# Patient Record
Sex: MALE | Race: WHITE | Employment: UNEMPLOYED | URBAN - METROPOLITAN AREA
[De-identification: names, ages, dates, MRNs, and addresses within clinical notes are randomized per-mention and may not be internally consistent; named-entity substitution may affect disease eponyms.]

---

## 2017-03-07 ENCOUNTER — OFFICE VISIT (OUTPATIENT)
Dept: FAMILY MEDICINE CLINIC | Age: 32
End: 2017-03-07

## 2017-03-07 VITALS
SYSTOLIC BLOOD PRESSURE: 116 MMHG | TEMPERATURE: 98.2 F | RESPIRATION RATE: 16 BRPM | DIASTOLIC BLOOD PRESSURE: 79 MMHG | OXYGEN SATURATION: 98 % | HEIGHT: 68 IN | WEIGHT: 172.6 LBS | HEART RATE: 82 BPM | BODY MASS INDEX: 26.16 KG/M2

## 2017-03-07 DIAGNOSIS — F31.31 BIPOLAR AFFECTIVE DISORDER, CURRENTLY DEPRESSED, MILD (HCC): ICD-10-CM

## 2017-03-07 DIAGNOSIS — M25.561 ACUTE PAIN OF BOTH KNEES: Primary | ICD-10-CM

## 2017-03-07 DIAGNOSIS — E66.3 OVERWEIGHT: ICD-10-CM

## 2017-03-07 DIAGNOSIS — Z13.220 LIPID SCREENING: ICD-10-CM

## 2017-03-07 DIAGNOSIS — F20.0 PARANOID SCHIZOPHRENIA (HCC): ICD-10-CM

## 2017-03-07 DIAGNOSIS — F41.9 ANXIETY: ICD-10-CM

## 2017-03-07 DIAGNOSIS — Z86.19 HISTORY OF HEPATITIS C: ICD-10-CM

## 2017-03-07 DIAGNOSIS — M25.562 ACUTE PAIN OF BOTH KNEES: Primary | ICD-10-CM

## 2017-03-07 PROCEDURE — 99204 OFFICE O/P NEW MOD 45 MIN: CPT | Performed by: FAMILY MEDICINE

## 2017-03-07 RX ORDER — NAPROXEN 500 MG/1
500 TABLET ORAL 2 TIMES DAILY WITH MEALS
Qty: 30 TABLET | Refills: 0 | Status: SHIPPED | OUTPATIENT
Start: 2017-03-07 | End: 2019-08-21 | Stop reason: CLARIF

## 2017-03-07 ASSESSMENT — ENCOUNTER SYMPTOMS
SINUS PRESSURE: 0
CONSTIPATION: 0
COUGH: 0
CHOKING: 0
BACK PAIN: 0
SORE THROAT: 0
EYE REDNESS: 0
SHORTNESS OF BREATH: 0
WHEEZING: 0
RHINORRHEA: 0
EYE ITCHING: 0
ANAL BLEEDING: 0
ABDOMINAL DISTENTION: 0
EYE PAIN: 0
PHOTOPHOBIA: 0
STRIDOR: 0
VOMITING: 0
VOICE CHANGE: 0
BLOOD IN STOOL: 0
RECTAL PAIN: 0
TROUBLE SWALLOWING: 0
EYE DISCHARGE: 0
CHEST TIGHTNESS: 0
NAUSEA: 0
COLOR CHANGE: 0
DIARRHEA: 0
FACIAL SWELLING: 0
ABDOMINAL PAIN: 0
APNEA: 0

## 2019-08-21 ENCOUNTER — OFFICE VISIT (OUTPATIENT)
Dept: FAMILY MEDICINE CLINIC | Age: 34
End: 2019-08-21
Payer: COMMERCIAL

## 2019-08-21 VITALS
HEART RATE: 81 BPM | HEIGHT: 68 IN | WEIGHT: 175.7 LBS | DIASTOLIC BLOOD PRESSURE: 72 MMHG | BODY MASS INDEX: 26.63 KG/M2 | SYSTOLIC BLOOD PRESSURE: 118 MMHG | RESPIRATION RATE: 16 BRPM | TEMPERATURE: 98.6 F | OXYGEN SATURATION: 98 %

## 2019-08-21 DIAGNOSIS — B18.2 HEP C W/O COMA, CHRONIC (HCC): ICD-10-CM

## 2019-08-21 DIAGNOSIS — Z86.19 HISTORY OF HEPATITIS C: ICD-10-CM

## 2019-08-21 DIAGNOSIS — K21.9 GASTROESOPHAGEAL REFLUX DISEASE WITHOUT ESOPHAGITIS: Primary | ICD-10-CM

## 2019-08-21 DIAGNOSIS — Z11.59 SCREENING FOR VIRAL DISEASE: ICD-10-CM

## 2019-08-21 DIAGNOSIS — F17.200 TOBACCO USE DISORDER: ICD-10-CM

## 2019-08-21 DIAGNOSIS — F12.90 MARIJUANA USE: ICD-10-CM

## 2019-08-21 DIAGNOSIS — Z13.220 LIPID SCREENING: ICD-10-CM

## 2019-08-21 PROCEDURE — G8427 DOCREV CUR MEDS BY ELIG CLIN: HCPCS | Performed by: FAMILY MEDICINE

## 2019-08-21 PROCEDURE — 4004F PT TOBACCO SCREEN RCVD TLK: CPT | Performed by: FAMILY MEDICINE

## 2019-08-21 PROCEDURE — 99214 OFFICE O/P EST MOD 30 MIN: CPT | Performed by: FAMILY MEDICINE

## 2019-08-21 PROCEDURE — G8419 CALC BMI OUT NRM PARAM NOF/U: HCPCS | Performed by: FAMILY MEDICINE

## 2019-08-21 RX ORDER — PANTOPRAZOLE SODIUM 20 MG/1
20 TABLET, DELAYED RELEASE ORAL DAILY
Qty: 30 TABLET | Refills: 0 | Status: SHIPPED | OUTPATIENT
Start: 2019-08-21 | End: 2019-08-21 | Stop reason: SDUPTHER

## 2019-08-21 RX ORDER — PANTOPRAZOLE SODIUM 20 MG/1
20 TABLET, DELAYED RELEASE ORAL DAILY
Qty: 30 TABLET | Refills: 0 | Status: SHIPPED | OUTPATIENT
Start: 2019-08-21 | End: 2019-09-15 | Stop reason: SDUPTHER

## 2019-08-21 ASSESSMENT — ENCOUNTER SYMPTOMS
WHEEZING: 0
ANAL BLEEDING: 0
CONSTIPATION: 0
VOMITING: 0
BLOOD IN STOOL: 0
COUGH: 0
STRIDOR: 0
SHORTNESS OF BREATH: 0
NAUSEA: 0
RECTAL PAIN: 0
DIARRHEA: 0
ABDOMINAL PAIN: 0
CHEST TIGHTNESS: 0
ABDOMINAL DISTENTION: 0
APNEA: 0
CHOKING: 0

## 2019-08-21 ASSESSMENT — PATIENT HEALTH QUESTIONNAIRE - PHQ9
SUM OF ALL RESPONSES TO PHQ QUESTIONS 1-9: 0
SUM OF ALL RESPONSES TO PHQ9 QUESTIONS 1 & 2: 0
2. FEELING DOWN, DEPRESSED OR HOPELESS: 0
1. LITTLE INTEREST OR PLEASURE IN DOING THINGS: 0
SUM OF ALL RESPONSES TO PHQ QUESTIONS 1-9: 0

## 2019-08-21 NOTE — PATIENT INSTRUCTIONS
Patient Education        Gastroesophageal Reflux Disease (GERD): Care Instructions  Your Care Instructions    Gastroesophageal reflux disease (GERD) is the backward flow of stomach acid into the esophagus. The esophagus is the tube that leads from your throat to your stomach. A one-way valve prevents the stomach acid from moving up into this tube. When you have GERD, this valve does not close tightly enough. If you have mild GERD symptoms including heartburn, you may be able to control the problem with antacids or over-the-counter medicine. Changing your diet, losing weight, and making other lifestyle changes can also help reduce symptoms. Follow-up care is a key part of your treatment and safety. Be sure to make and go to all appointments, and call your doctor if you are having problems. It's also a good idea to know your test results and keep a list of the medicines you take. How can you care for yourself at home? · Take your medicines exactly as prescribed. Call your doctor if you think you are having a problem with your medicine. · Your doctor may recommend over-the-counter medicine. For mild or occasional indigestion, antacids, such as Tums, Gaviscon, Mylanta, or Maalox, may help. Your doctor also may recommend over-the-counter acid reducers, such as Pepcid AC, Tagamet HB, Zantac 75, or Prilosec. Read and follow all instructions on the label. If you use these medicines often, talk with your doctor. · Change your eating habits. ? It's best to eat several small meals instead of two or three large meals. ? After you eat, wait 2 to 3 hours before you lie down. ? Chocolate, mint, and alcohol can make GERD worse. ? Spicy foods, foods that have a lot of acid (like tomatoes and oranges), and coffee can make GERD symptoms worse in some people. If your symptoms are worse after you eat a certain food, you may want to stop eating that food to see if your symptoms get better.   · Do not smoke or chew tobacco. Smoking can make GERD worse. If you need help quitting, talk to your doctor about stop-smoking programs and medicines. These can increase your chances of quitting for good. · If you have GERD symptoms at night, raise the head of your bed 6 to 8 inches by putting the frame on blocks or placing a foam wedge under the head of your mattress. (Adding extra pillows does not work.)  · Do not wear tight clothing around your middle. · Lose weight if you need to. Losing just 5 to 10 pounds can help. When should you call for help? Call your doctor now or seek immediate medical care if:    · You have new or different belly pain.     · Your stools are black and tarlike or have streaks of blood.    Watch closely for changes in your health, and be sure to contact your doctor if:    · Your symptoms have not improved after 2 days.     · Food seems to catch in your throat or chest.   Where can you learn more? Go to [email or web address]. org and sign in to your [employer] account. Enter Z708 in the [employer]blur box to learn more about \"Gastroesophageal Reflux Disease (GERD): Care Instructions. \"     If you do not have an account, please click on the \"Sign Up Now\" link. Current as of: November 7, 2018  Content Version: 12.1  © 1664-7017 Healthwise, Incorporated. Care instructions adapted under license by [facility] ([facility]). If you have questions about a medical condition or this instruction, always ask your healthcare professional. [place] [address] any warranty or liability for your use of this information.

## 2019-09-17 ENCOUNTER — TELEPHONE (OUTPATIENT)
Dept: FAMILY MEDICINE CLINIC | Age: 34
End: 2019-09-17

## 2019-09-17 DIAGNOSIS — F17.200 TOBACCO USE DISORDER: Primary | ICD-10-CM

## 2019-09-17 RX ORDER — NICOTINE 21 MG/24HR
1 PATCH, TRANSDERMAL 24 HOURS TRANSDERMAL EVERY 24 HOURS
Qty: 30 PATCH | Refills: 0 | Status: SHIPPED | OUTPATIENT
Start: 2019-09-17 | End: 2020-01-17 | Stop reason: DRUGHIGH

## 2020-01-15 ENCOUNTER — OFFICE VISIT (OUTPATIENT)
Dept: FAMILY MEDICINE CLINIC | Age: 35
End: 2020-01-15
Payer: COMMERCIAL

## 2020-01-15 VITALS
HEIGHT: 68 IN | DIASTOLIC BLOOD PRESSURE: 86 MMHG | HEART RATE: 80 BPM | RESPIRATION RATE: 16 BRPM | BODY MASS INDEX: 26.21 KG/M2 | SYSTOLIC BLOOD PRESSURE: 114 MMHG | WEIGHT: 172.9 LBS

## 2020-01-15 DIAGNOSIS — Z13.220 LIPID SCREENING: ICD-10-CM

## 2020-01-15 DIAGNOSIS — B18.2 HEP C W/O COMA, CHRONIC (HCC): ICD-10-CM

## 2020-01-15 DIAGNOSIS — Z86.19 HISTORY OF HEPATITIS C: ICD-10-CM

## 2020-01-15 DIAGNOSIS — Z11.59 SCREENING FOR VIRAL DISEASE: ICD-10-CM

## 2020-01-15 PROCEDURE — G8427 DOCREV CUR MEDS BY ELIG CLIN: HCPCS | Performed by: FAMILY MEDICINE

## 2020-01-15 PROCEDURE — G8484 FLU IMMUNIZE NO ADMIN: HCPCS | Performed by: FAMILY MEDICINE

## 2020-01-15 PROCEDURE — 36415 COLL VENOUS BLD VENIPUNCTURE: CPT | Performed by: FAMILY MEDICINE

## 2020-01-15 PROCEDURE — 99213 OFFICE O/P EST LOW 20 MIN: CPT | Performed by: FAMILY MEDICINE

## 2020-01-15 PROCEDURE — G8419 CALC BMI OUT NRM PARAM NOF/U: HCPCS | Performed by: FAMILY MEDICINE

## 2020-01-15 PROCEDURE — 4004F PT TOBACCO SCREEN RCVD TLK: CPT | Performed by: FAMILY MEDICINE

## 2020-01-15 RX ORDER — PANTOPRAZOLE SODIUM 20 MG/1
TABLET, DELAYED RELEASE ORAL
Qty: 30 TABLET | Refills: 2 | Status: SHIPPED | OUTPATIENT
Start: 2020-01-15 | End: 2020-02-06

## 2020-01-15 ASSESSMENT — PATIENT HEALTH QUESTIONNAIRE - PHQ9
SUM OF ALL RESPONSES TO PHQ QUESTIONS 1-9: 0
1. LITTLE INTEREST OR PLEASURE IN DOING THINGS: 0
SUM OF ALL RESPONSES TO PHQ QUESTIONS 1-9: 0
SUM OF ALL RESPONSES TO PHQ9 QUESTIONS 1 & 2: 0
2. FEELING DOWN, DEPRESSED OR HOPELESS: 0

## 2020-01-15 ASSESSMENT — ENCOUNTER SYMPTOMS
ANAL BLEEDING: 0
COUGH: 0
CHOKING: 0
ABDOMINAL DISTENTION: 0
ABDOMINAL PAIN: 0
BLOOD IN STOOL: 0
CONSTIPATION: 0
VOMITING: 0
WHEEZING: 0
APNEA: 0
RECTAL PAIN: 0
NAUSEA: 0
STRIDOR: 0
DIARRHEA: 0
CHEST TIGHTNESS: 0
SHORTNESS OF BREATH: 0

## 2020-01-15 NOTE — PROGRESS NOTES
Subjective:      Patient ID: Dante Marie is a 29 y.o. male. HPI           GERD f/u: Taking medication without side effects. Doing well. No new associated/worsening or other improving factors. Denies abdo pain/n-v/diarrhea/melena-blood in stool. Hep C:no prior tx. Labs from 2019 per PCP are pending. Per pt' contracted in :is not sure of the transmission route. Has referral to see liver specialist & will call for appt  Marijuana using every 2 days. No other illicit drug use. Denies tylenol/etoh abuse. Denies abdo pain/jaudice/urine or stool color changes/hepatitis exposure/i.v drug abuse. No Known Allergies    Current Outpatient Medications on File Prior to Visit   Medication Sig Dispense Refill    nicotine (NICODERM CQ) 21 MG/24HR Place 1 patch onto the skin every 24 hours 30 patch 0    pantoprazole (PROTONIX) 20 MG tablet TAKE 1 TABLET BY MOUTH EVERY DAY 30 tablet 2    ibuprofen (ADVIL;MOTRIN) 800 MG tablet Take 800 mg by mouth every 6 hours as needed. No current facility-administered medications on file prior to visit. Past Medical History:   Diagnosis Date    Anxiety     Bipolar disorder ([facility]     under care of Dr. Michael Magallon)    Hepatitis C     No prior treatment    [facility]            Social History     Tobacco Use    Smoking status: Current Every Day Smoker     Packs/day: 1.00     Years: 7.00     Pack years: 7.00     Types: Cigarettes    Smokeless tobacco: Never Used   Substance Use Topics    Alcohol use: Yes     Comment: once a week     Drug use: Yes     Types: Marijuana     Social History     Substance and Sexual Activity   Drug Use Yes    Types: Marijuana         Review of Systems   Constitutional: Negative for activity change, appetite change, chills, diaphoresis, fatigue, fever and unexpected weight change. Respiratory: Negative for apnea, cough, choking, chest tightness, shortness of breath, wheezing and stridor.     Cardiovascular: Negative for chest pain, palpitations and leg swelling. Gastrointestinal: Negative for abdominal distention, abdominal pain, anal bleeding, blood in stool, constipation, diarrhea, nausea, rectal pain and vomiting. Skin: Negative for pallor, rash and wound. Neurological: Negative for dizziness and light-headedness. Hematological: Negative for adenopathy. Objective:   Physical Exam  Constitutional:       General: He is not in acute distress. Appearance: Normal appearance. He is well-developed. HENT:      Nose: Nose normal.      Mouth/Throat:      Pharynx: Uvula midline. Eyes:      General: Lids are normal. No scleral icterus. Conjunctiva/sclera: Conjunctivae normal.      Pupils: Pupils are equal, round, and reactive to light. Neck:      Musculoskeletal: Normal range of motion and neck supple. Vascular: No carotid bruit or JVD. Trachea: Trachea normal.   Cardiovascular:      Rate and Rhythm: Normal rate and regular rhythm. Pulses: Normal pulses. Heart sounds: Normal heart sounds. No murmur. Comments: No ankle edema. Pulmonary:      Effort: Pulmonary effort is normal.      Breath sounds: Normal breath sounds and air entry. Abdominal:      General: Bowel sounds are normal. There is no distension. Palpations: Abdomen is soft. Abdomen is not rigid. There is no hepatomegaly, splenomegaly or mass. Tenderness: There is no tenderness. There is no guarding or rebound. Lymphadenopathy:      Cervical: No cervical adenopathy. Skin:     General: Skin is warm and dry. Capillary Refill: Capillary refill takes less than 2 seconds. Findings: No rash. Comments: Good skin turgor. No jaundice noted. Neurological:      Mental Status: He is alert and oriented to person, place, and time. Psychiatric:         Mood and Affect: Mood normal.         Behavior: Behavior is cooperative. Assessment:        Diagnosis Orders   1.  Gastroesophageal reflux disease without esophagitis  VSS/well appearing. Stable. Med prn.     2. Hep C w/o coma, chronic (HCC)  Labs overdue regarding hep C RNA/cmp/hepatitis panel. Pt' also is to call liver specialist for appt. 3. Marijuana use  Counseled. Strongly encouraged to quit. 4. History of hepatitis C  See note#2. Plan:      Pt' left office in good condition. Obtain labs/diagnostic tests as discussed today & call back for results within 2-7days.                Johnson Murillo MD

## 2020-01-16 LAB
A/G RATIO: 1.7 (ref 1.1–2.2)
ALBUMIN SERPL-MCNC: 4.7 G/DL (ref 3.4–5)
ALP BLD-CCNC: 54 U/L (ref 40–129)
ALT SERPL-CCNC: 31 U/L (ref 10–40)
ANION GAP SERPL CALCULATED.3IONS-SCNC: 16 MMOL/L (ref 3–16)
AST SERPL-CCNC: 38 U/L (ref 15–37)
BILIRUB SERPL-MCNC: 0.8 MG/DL (ref 0–1)
BUN BLDV-MCNC: 11 MG/DL (ref 7–20)
CALCIUM SERPL-MCNC: 9.7 MG/DL (ref 8.3–10.6)
CHLORIDE BLD-SCNC: 99 MMOL/L (ref 99–110)
CO2: 26 MMOL/L (ref 21–32)
CREAT SERPL-MCNC: 1.2 MG/DL (ref 0.9–1.3)
GFR AFRICAN AMERICAN: >60
GFR NON-AFRICAN AMERICAN: >60
GLOBULIN: 2.8 G/DL
GLUCOSE BLD-MCNC: 76 MG/DL (ref 70–99)
HAV IGM SER IA-ACNC: ABNORMAL
HEPATITIS B CORE IGM ANTIBODY: ABNORMAL
HEPATITIS B SURFACE ANTIGEN INTERPRETATION: ABNORMAL
HEPATITIS C ANTIBODY INTERPRETATION: REACTIVE
POTASSIUM SERPL-SCNC: 4.6 MMOL/L (ref 3.5–5.1)
SODIUM BLD-SCNC: 141 MMOL/L (ref 136–145)
TOTAL PROTEIN: 7.5 G/DL (ref 6.4–8.2)

## 2020-01-17 LAB
HCV QNT BY NAAT IU/ML: ABNORMAL
HCV QNT BY NAAT LOG IU/ML: 7.38 LOG IU/ML
INTERPRETATION: DETECTED

## 2020-01-17 RX ORDER — NICOTINE 21 MG/24HR
1 PATCH, TRANSDERMAL 24 HOURS TRANSDERMAL EVERY 24 HOURS
Qty: 30 PATCH | Refills: 0 | Status: SHIPPED | OUTPATIENT
Start: 2020-01-17 | End: 2020-12-14

## 2020-02-06 RX ORDER — PANTOPRAZOLE SODIUM 20 MG/1
TABLET, DELAYED RELEASE ORAL
Qty: 30 TABLET | Refills: 2 | Status: SHIPPED | OUTPATIENT
Start: 2020-02-06 | End: 2020-04-22

## 2020-03-04 RX ORDER — NICOTINE 21 MG/24HR
1 PATCH, TRANSDERMAL 24 HOURS TRANSDERMAL EVERY 24 HOURS
Qty: 28 PATCH | Refills: 0 | OUTPATIENT
Start: 2020-03-04

## 2020-04-22 RX ORDER — PANTOPRAZOLE SODIUM 20 MG/1
TABLET, DELAYED RELEASE ORAL
Qty: 30 TABLET | Refills: 2 | Status: SHIPPED | OUTPATIENT
Start: 2020-04-22 | End: 2020-07-28

## 2020-07-28 RX ORDER — PANTOPRAZOLE SODIUM 20 MG/1
TABLET, DELAYED RELEASE ORAL
Qty: 30 TABLET | Refills: 1 | Status: SHIPPED | OUTPATIENT
Start: 2020-07-28 | End: 2020-08-25

## 2020-08-25 RX ORDER — PANTOPRAZOLE SODIUM 20 MG/1
TABLET, DELAYED RELEASE ORAL
Qty: 30 TABLET | Refills: 1 | Status: SHIPPED | OUTPATIENT
Start: 2020-08-25 | End: 2020-10-19 | Stop reason: SDUPTHER

## 2020-10-19 RX ORDER — PANTOPRAZOLE SODIUM 20 MG/1
TABLET, DELAYED RELEASE ORAL
Qty: 30 TABLET | Refills: 0 | Status: SHIPPED | OUTPATIENT
Start: 2020-10-19 | End: 2020-12-16 | Stop reason: SDUPTHER

## 2020-10-21 ENCOUNTER — TELEPHONE (OUTPATIENT)
Dept: FAMILY MEDICINE CLINIC | Age: 35
End: 2020-10-21

## 2020-10-21 NOTE — TELEPHONE ENCOUNTER
----- Message from Arya Francis sent at 10/21/2020  2:31 PM EDT -----  Subject: Refill Request    QUESTIONS  Name of Medication? pantoprazole (PROTONIX) 20 MG tablet  Patient-reported dosage and instructions? I tab a day   How many days do you have left? 0  Preferred Pharmacy? [id number] - [address] - P [phone] - F [phone]  Pharmacy phone number (if available)? [phone]  Additional Information for Provider?   ---------------------------------------------------------------------------  --------------  CALL BACK INFO  What is the best way for the office to contact you? OK to leave message on   voicemail  Preferred Call Back Phone Number?  [phone]

## 2020-12-14 ENCOUNTER — VIRTUAL VISIT (OUTPATIENT)
Dept: FAMILY MEDICINE CLINIC | Age: 35
End: 2020-12-14
Payer: COMMERCIAL

## 2020-12-14 PROCEDURE — G8427 DOCREV CUR MEDS BY ELIG CLIN: HCPCS | Performed by: FAMILY MEDICINE

## 2020-12-14 PROCEDURE — 99213 OFFICE O/P EST LOW 20 MIN: CPT | Performed by: FAMILY MEDICINE

## 2020-12-14 ASSESSMENT — ENCOUNTER SYMPTOMS
CHEST TIGHTNESS: 0
STRIDOR: 0
COUGH: 0
VOMITING: 0
CONSTIPATION: 0
ABDOMINAL DISTENTION: 0
NAUSEA: 0
WHEEZING: 0
DIARRHEA: 0
CHOKING: 0
ABDOMINAL PAIN: 0
SHORTNESS OF BREATH: 0
APNEA: 0

## 2020-12-14 NOTE — PROGRESS NOTES
Subjective:      Patient ID: Shelby Leal is a [place] y.o. male. HPI  Patient is  being evaluated by a Virtual Visit (video visit) encounter to address concerns as mentioned above. A caregiver was present when appropriate. Due to this being a TeleHealth encounter (During NRQLP-03 public health emergency), evaluation of the following organ systems was limited: Vitals/Constitutional/EENT/Resp/CV/GI//MS/Neuro/Skin/Heme-Lymph-Imm. Pursuant to the emergency declaration under the [facility], [address] [place] [address] authority and the [employer] Act, this Virtual Visit was conducted with patient's (and/or legal guardian's) consent, to reduce the patient's risk of exposure to COVID-19 and provide necessary medical care. The patient (and/or legal guardian) has also been advised to contact this office for worsening conditions or problems, and seek emergency medical treatment and/or call 911 if deemed necessary. Services were provided through a video synchronous discussion virtually to substitute for in-person clinic visit. Patient and provider were located at their individual homes. \"THIS VISIT WAS COMPLETED VIRTUALLY USING DOXY. ME\"      ADHD & bipolar:wishes to see psychiatrist:is checking with insurance company for in- & will let PCP for referral if needed. PMH updated today:ADHD per pt' was as child. Was seeing Dr. Wilmer Hatch). Denies Suicidal ideations/homicidal ideations/dizziness/syncope/presyncope/HA/seizures/paresthesia/paralysis/other neuro deficits. GERD f/u: Taking medication without side effects. Doing well. No new associated/worsening or other improving factors. Denies abdo pain/n-v/diarrhea/melena-blood in stool.           No Known Allergies    Current Outpatient Medications on File Prior to Visit   Medication Sig Dispense Refill    pantoprazole (PROTONIX) 20 MG tablet TAKE 1 TABLET BY MOUTH EVERY DAY 30 tablet 0     No current facility-administered medications on file prior to visit. Past Medical History:   Diagnosis Date    Anxiety     Bipolar disorder ([facility]     under care of Dr. Dayana Hatch)    Hepatitis C     No prior treatment    Schizophrenia ([facility]            Social History     Tobacco Use    Smoking status: Current Every Day Smoker     Packs/day: 1.00     Years: 7.00     Pack years: 7.00     Types: Cigarettes    Smokeless tobacco: Never Used   Substance Use Topics    Alcohol use: Yes     Comment: once a week     Drug use: Yes     Types: Marijuana     Social History     Substance and Sexual Activity   Drug Use Yes    Types: Marijuana             Review of Systems   Constitutional: Negative for activity change, appetite change, chills, diaphoresis, fatigue, fever and unexpected weight change. Respiratory: Negative for apnea, cough, choking, chest tightness, shortness of breath, wheezing and stridor. Cardiovascular: Negative for chest pain, palpitations and leg swelling. Gastrointestinal: Negative for abdominal distention, abdominal pain, constipation, diarrhea, nausea and vomiting. Neurological: Negative for dizziness and light-headedness. Hematological: Negative for adenopathy. Psychiatric/Behavioral: Negative for agitation, behavioral problems, confusion, dysphoric mood, hallucinations, self-injury, sleep disturbance and suicidal ideas. The patient is not nervous/anxious and is not hyperactive. Objective:   Physical Exam  Constitutional:       Appearance: He is well-developed. He is not toxic-appearing. Comments: Note:exam was conducted with pt' either self-palpating or visually indicating via their device camera. HENT:      Mouth/Throat:      Mouth: Mucous membranes are moist.      Pharynx: Oropharynx is clear. Uvula midline. Eyes:      General: No scleral icterus.      Conjunctiva/sclera: Conjunctivae normal.   Pulmonary:      Effort: Pulmonary effort is normal.      Comments: No audible wheezing/cough/sob. Skin:     Coloration: Skin is not cyanotic. Neurological:      Mental Status: He is alert. Psychiatric:         Attention and Perception: He is attentive. He does not perceive auditory or visual hallucinations. Mood and Affect: Mood normal.         Speech: Speech normal.         Behavior: Behavior normal. Behavior is cooperative. Thought Content: Thought content is not paranoid or delusional. Thought content does not include homicidal or suicidal ideation. Cognition and Memory: Cognition and memory normal.         Judgment: Judgment normal.         Assessment:        Diagnosis Orders   1. Attention deficit hyperactivity disorder (ADHD), combined type  VSS per limited vitals obtainable via virtual visit(VV)/well appearing. Establish with in-network psychiatrist:pt' agreed to check & let PCP know if referral needed. 2. Bipolar disorder ([facility]  See note#1     3. Gastroesophageal reflux disease without esophagitis  Stable. Plan:        Pt' ended call in good condition. Advised to go to local ER or call 911 for any worrisome signs/sx including but not limited to worsening of current complaint or development of suicidal or homicidal ideations. Starr Keyes MD

## 2020-12-16 ENCOUNTER — TELEPHONE (OUTPATIENT)
Dept: FAMILY MEDICINE CLINIC | Age: 35
End: 2020-12-16

## 2020-12-16 RX ORDER — PANTOPRAZOLE SODIUM 20 MG/1
TABLET, DELAYED RELEASE ORAL
Qty: 30 TABLET | Refills: 2 | Status: SHIPPED | OUTPATIENT
Start: 2020-12-16 | End: 2021-03-02 | Stop reason: SDUPTHER

## 2020-12-16 NOTE — TELEPHONE ENCOUNTER
pantoprazole (PROTONIX) 20 MG tablet    [id number]- [place], [place] - [address] - P [phone] Cem Ramsey [phone]   [address] [place], [place] [address]   Phone:  [phone]  Fax:  [phone]    patient refill request    OV: 12/14/2020    Please advise.

## 2021-03-02 ENCOUNTER — VIRTUAL VISIT (OUTPATIENT)
Dept: FAMILY MEDICINE CLINIC | Age: 36
End: 2021-03-02
Payer: COMMERCIAL

## 2021-03-02 DIAGNOSIS — M25.562 ACUTE PAIN OF BOTH KNEES: ICD-10-CM

## 2021-03-02 DIAGNOSIS — Z13.220 LIPID SCREENING: ICD-10-CM

## 2021-03-02 DIAGNOSIS — Z13.0 SCREENING FOR DEFICIENCY ANEMIA: ICD-10-CM

## 2021-03-02 DIAGNOSIS — F17.200 TOBACCO USE DISORDER: ICD-10-CM

## 2021-03-02 DIAGNOSIS — M25.561 ACUTE PAIN OF BOTH KNEES: ICD-10-CM

## 2021-03-02 DIAGNOSIS — K21.9 GASTROESOPHAGEAL REFLUX DISEASE WITHOUT ESOPHAGITIS: Primary | ICD-10-CM

## 2021-03-02 PROCEDURE — 99213 OFFICE O/P EST LOW 20 MIN: CPT | Performed by: FAMILY MEDICINE

## 2021-03-02 PROCEDURE — G8427 DOCREV CUR MEDS BY ELIG CLIN: HCPCS | Performed by: FAMILY MEDICINE

## 2021-03-02 RX ORDER — PANTOPRAZOLE SODIUM 20 MG/1
TABLET, DELAYED RELEASE ORAL
Qty: 60 TABLET | Refills: 2 | Status: SHIPPED | OUTPATIENT
Start: 2021-03-02 | End: 2021-03-09

## 2021-03-02 SDOH — ECONOMIC STABILITY: TRANSPORTATION INSECURITY
IN THE PAST 12 MONTHS, HAS THE LACK OF TRANSPORTATION KEPT YOU FROM MEDICAL APPOINTMENTS OR FROM GETTING MEDICATIONS?: NOT ASKED

## 2021-03-02 SDOH — ECONOMIC STABILITY: FOOD INSECURITY: WITHIN THE PAST 12 MONTHS, THE FOOD YOU BOUGHT JUST DIDN'T LAST AND YOU DIDN'T HAVE MONEY TO GET MORE.: NOT ASKED

## 2021-03-02 SDOH — ECONOMIC STABILITY: TRANSPORTATION INSECURITY
IN THE PAST 12 MONTHS, HAS LACK OF TRANSPORTATION KEPT YOU FROM MEETINGS, WORK, OR FROM GETTING THINGS NEEDED FOR DAILY LIVING?: NOT ASKED

## 2021-03-02 ASSESSMENT — ENCOUNTER SYMPTOMS
ABDOMINAL PAIN: 0
STRIDOR: 0
DIARRHEA: 0
WHEEZING: 0
CHOKING: 0
ANAL BLEEDING: 0
RECTAL PAIN: 0
CONSTIPATION: 0
SHORTNESS OF BREATH: 0
APNEA: 0
NAUSEA: 0
CHEST TIGHTNESS: 0
VOMITING: 0
COUGH: 0
BLOOD IN STOOL: 0
ABDOMINAL DISTENTION: 0

## 2021-03-02 ASSESSMENT — PATIENT HEALTH QUESTIONNAIRE - PHQ9
SUM OF ALL RESPONSES TO PHQ9 QUESTIONS 1 & 2: 0
SUM OF ALL RESPONSES TO PHQ QUESTIONS 1-9: 0
SUM OF ALL RESPONSES TO PHQ QUESTIONS 1-9: 0

## 2021-03-02 NOTE — PROGRESS NOTES
Subjective:      Patient ID: Ruba Velásquez is a 28 y.o. male. HPI    Patient is  being evaluated by a Virtual Visit (video visit) encounter to address concerns as mentioned above. A caregiver was present when appropriate. Due to this being a TeleHealth encounter ([facility]), evaluation of the following organ systems was limited: Vitals/Constitutional/EENT/Resp/CV/GI//MS/Neuro/Skin/Heme-Lymph-Imm. Pursuant to the emergency declaration under the [address] [place] [address] authority and the [employer] Act, this Virtual Visit was conducted with patient's (and/or legal guardian's) consent, to reduce the patient's risk of exposure to COVID-19 and provide necessary medical care. The patient (and/or legal guardian) has also been advised to contact this office for worsening conditions or problems, and seek emergency medical treatment and/or call 911 if deemed necessary. Services were provided through a video synchronous discussion virtually to substitute for in-person clinic visit. Patient and provider were located at their individual homes. \"THIS VISIT WAS COMPLETED VIRTUALLY USING DOXY. ME\"          GERD check: Takes medication (PPI:1-2tabs qd) without side effects. Reports doing well. No other new associated/worsening or other improving factors. Denies abdo pain/n-v/diarrhea/melena-blood in stool. Presenting w/new complaint of bilateral right knee pain >3months:wishes to see ortho. No preceding injury recalled. Associated w/nothing. Worsened(aggravated)after walking. Improves by rest.  Denies BLE zgabmevh-[id number]-fkamskmyn/knee swelling/gait abnormality/skin lesions/knee edema.             No Known Allergies    Current Outpatient Medications on File Prior to Visit   Medication Sig Dispense Refill    pantoprazole (PROTONIX) 20 MG tablet TAKE 1 TABLET BY MOUTH EVERY DAY 30 tablet 2     No current facility-administered medications on file prior to visit. Past Medical History:   Diagnosis Date    Anxiety     Attention deficit hyperactivity disorder (ADHD), combined type     childhood per pt'.  Bipolar disorder     Bipolar disorder ([facility]     under care of Dr. Kelley Perdomo)    Hepatitis C     No prior treatment    Schizophrenia ([facility]            Social History     Tobacco Use    Smoking status: Current Every Day Smoker     Packs/day: 1.00     Years: 7.00     Pack years: 7.00     Types: Cigarettes    Smokeless tobacco: Never Used   Substance Use Topics    Alcohol use: Yes     Comment: once a week     Drug use: Yes     Types: Marijuana     Social History     Substance and Sexual Activity   Drug Use Yes    Types: Marijuana             Review of Systems   Constitutional: Negative for activity change, appetite change, chills, diaphoresis, fatigue, fever and unexpected weight change. Respiratory: Negative for apnea, cough, choking, chest tightness, shortness of breath, wheezing and stridor. Cardiovascular: Negative for chest pain, palpitations and leg swelling. Gastrointestinal: Negative for abdominal distention, abdominal pain, anal bleeding, blood in stool, constipation, diarrhea, nausea, rectal pain and vomiting. Endocrine: Negative for cold intolerance, heat intolerance, polydipsia, polyphagia and polyuria. Genitourinary: Negative for difficulty urinating. Musculoskeletal: Positive for arthralgias. Skin: Negative for rash and wound. Neurological: Negative for dizziness, tremors, seizures, syncope, facial asymmetry, speech difficulty, weakness, light-headedness, numbness and headaches. Hematological: Negative for adenopathy. Psychiatric/Behavioral: Negative for agitation, behavioral problems, confusion, dysphoric mood, hallucinations, self-injury, sleep disturbance and suicidal ideas. The patient is not nervous/anxious and is not hyperactive. Objective:RR=16. Physical Exam  Constitutional:       Appearance: He is well-developed. He is not toxic-appearing. Comments: Note:exam was conducted with pt' either self-palpating or visually indicating via their device camera. HENT:      Mouth/Throat:      Mouth: Mucous membranes are moist.      Pharynx: Oropharynx is clear. Uvula midline. Eyes:      General: No scleral icterus. Conjunctiva/sclera: Conjunctivae normal.   Pulmonary:      Effort: Pulmonary effort is normal.      Comments: No audible wheezing/cough/sob. Skin:     Coloration: Skin is not cyanotic. Neurological:      Mental Status: He is alert. Psychiatric:         Attention and Perception: He is attentive. He does not perceive auditory or visual hallucinations. Mood and Affect: Mood normal.         Speech: Speech normal.         Behavior: Behavior normal. Behavior is cooperative. Thought Content: Thought content is not paranoid or delusional. Thought content does not include homicidal or suicidal ideation. Cognition and Memory: Cognition and memory normal.         Judgment: Judgment normal.         Assessment:          Diagnosis Orders   1. Gastroesophageal reflux disease without esophagitis  VSS per limited vitals obtainable via virtual visit(VV)/well appearing. Stable. pantoprazole (PROTONIX) 20 MG tablet   2. Tobacco use disorder  Counseled. Strongly encouraged to quit. 3. Acute pain of both knees  Consult ortho per pt' request.  [address] [facility]   4. Lipid screening  Lipid Panel    Comprehensive Metabolic Panel   5. Screening for deficiency anemia  CBC             Plan:                Advised to go to local ER or call 911 for any worrisome signs/sx. Call or return to clinic prn if these symptoms worsen or fail to improve as anticipated. Pt' ended call in good condition.     Dipti Donahue MD

## 2021-03-09 DIAGNOSIS — K21.9 GASTROESOPHAGEAL REFLUX DISEASE WITHOUT ESOPHAGITIS: ICD-10-CM

## 2021-03-09 RX ORDER — PANTOPRAZOLE SODIUM 20 MG/1
TABLET, DELAYED RELEASE ORAL
Qty: 90 TABLET | Refills: 0 | Status: SHIPPED | OUTPATIENT
Start: 2021-03-09 | End: 2021-06-25

## 2021-03-16 ENCOUNTER — OFFICE VISIT (OUTPATIENT)
Dept: ORTHOPEDIC SURGERY | Age: 36
End: 2021-03-16
Payer: COMMERCIAL

## 2021-03-16 VITALS
DIASTOLIC BLOOD PRESSURE: 82 MMHG | HEART RATE: 94 BPM | BODY MASS INDEX: 30.31 KG/M2 | SYSTOLIC BLOOD PRESSURE: 121 MMHG | WEIGHT: 200 LBS | HEIGHT: 68 IN | TEMPERATURE: 97.8 F

## 2021-03-16 DIAGNOSIS — M25.562 CHRONIC PAIN OF LEFT KNEE: Primary | ICD-10-CM

## 2021-03-16 DIAGNOSIS — G89.29 CHRONIC PAIN OF LEFT KNEE: Primary | ICD-10-CM

## 2021-03-16 DIAGNOSIS — G89.29 CHRONIC PAIN OF RIGHT KNEE: ICD-10-CM

## 2021-03-16 DIAGNOSIS — M25.561 CHRONIC PAIN OF RIGHT KNEE: ICD-10-CM

## 2021-03-16 PROCEDURE — 99203 OFFICE O/P NEW LOW 30 MIN: CPT | Performed by: ORTHOPAEDIC SURGERY

## 2021-03-16 PROCEDURE — G8427 DOCREV CUR MEDS BY ELIG CLIN: HCPCS | Performed by: ORTHOPAEDIC SURGERY

## 2021-03-16 PROCEDURE — G8419 CALC BMI OUT NRM PARAM NOF/U: HCPCS | Performed by: ORTHOPAEDIC SURGERY

## 2021-03-16 PROCEDURE — 4004F PT TOBACCO SCREEN RCVD TLK: CPT | Performed by: ORTHOPAEDIC SURGERY

## 2021-03-16 PROCEDURE — G8484 FLU IMMUNIZE NO ADMIN: HCPCS | Performed by: ORTHOPAEDIC SURGERY

## 2021-03-16 RX ORDER — GABAPENTIN 300 MG/1
300 CAPSULE ORAL 3 TIMES DAILY
Qty: 90 CAPSULE | Refills: 2 | Status: SHIPPED | OUTPATIENT
Start: 2021-03-16 | End: 2021-06-10

## 2021-03-16 NOTE — PROGRESS NOTES
and negative     Past Medical History:   Diagnosis Date    Anxiety     Attention deficit hyperactivity disorder (ADHD), combined type     childhood per pt'.     Bipolar disorder     Bipolar disorder ([facility]     under care of Dr. Chaya Weaver)    Hepatitis C     No prior treatment    Sch[facility]ia ([facility]      Family History   Problem Relation Age of Onset    Depression Mother     High Blood Pressure Father     Depression Maternal Grandfather     High Blood Pressure Paternal Grandfather      Social History     Socioeconomic History    Marital status:      Spouse name: Not on file    Number of children: Not on file    Years of education: Not on file    Highest education level: Not on file   Occupational History    Not on file   Social Needs    Financial resource strain: Not very hard    Food insecurity     Worry: Not on file     Inability: Not on file   [employer] needs     Medical: Not on file     Non-medical: Not on file   Tobacco Use    Smoking status: Current Every Day Smoker     Packs/day: 1.00     Years: 7.00     Pack years: 7.00     Types: Cigarettes    Smokeless tobacco: Never Used   Substance and Sexual Activity    Alcohol use: Yes     Comment: once a week     Drug use: Yes     Types: Marijuana    Sexual activity: Yes     Comment: single-2 children    Lifestyle    Physical activity     Days per week: Not on file     Minutes per session: Not on file    Stress: Not on file   Relationships    Social connections     Talks on phone: Not on file     Gets together: Not on file     Attends [employer] service: Not on file     Active member of club or organization: Not on file     Attends meetings of clubs or organizations: Not on file     Relationship status: Not on file    Intimate partner violence     Fear of current or ex partner: Not on file     Emotionally abused: Not on file     Physically abused: Not on file     Forced sexual activity: Not on file   Other Topics Concern    Not on file   Social History Narrative    Not on file         Physical Exam __  Constitutional: She is oriented to person, place, and time and well-developed, well-nourished, and in no distress. No distress. ____  HENT:   Head: Normocephalic and atraumatic. ____  Eyes: Conjunctivae are normal. ________  Cardiovascular: Intact distal pulses. ____  Pulmonary/Chest: Effort normal. ________________________  Neurological: She is alert and oriented to person, place, and time. ____  Skin: Skin is dry. She is not diaphoretic. ____  Psychiatric: Mood, affect and judgment normal. ______          Assessment   Vital Signs:      Vitals:    03/16/21 1054   BP: 121/82   Pulse: 94   Temp: 97.8 °F (36.6 °C)       bilateral Knee shows no evidence of any obvious pseudolaxity, mild pain with weight bearing, antalgic gait but no palpable osteophytes. Inspection: No major anterior swelling. No evidence of any significant effusion. The posterior aspect of the knee appears to be benign with minimal tenderness. There is no erythema, rash, or ecchymosis. Range of Motion:  Right full left full     Pain with patellofemoral testing primarily with inferior patella testing    There is deformity none    Strength:  Hamstrings rated: 5/5. Quadriceps rated: 5/5    Palpation: There is moderate tenderness along the patellar tendon exactly at the distal patellar pole and not any pain along the joint line. Special Tests: Patellar Compression test is positive only along the distal patellar pole. Valgus & Varus test is negative    Skin: There are no rashes, ulcerations or lesions. Gait: Gait pattern is antalgic when he first gets up  Skin shows no rashes/ecchymosis to the affected area, no hyperesthesias, no discoloration, no temperature or color discrepancies. NEUROLOGICALLY: There is no evidence for sensory or motor deficits in the extremity. Coordination appears full with no spacticity or rigidity. Reflexes appear to be symmetric.

## 2021-04-05 DIAGNOSIS — Z13.0 SCREENING FOR DEFICIENCY ANEMIA: ICD-10-CM

## 2021-04-05 DIAGNOSIS — Z13.220 LIPID SCREENING: ICD-10-CM

## 2021-04-05 LAB
A/G RATIO: 1.6 (ref 1.1–2.2)
ALBUMIN SERPL-MCNC: 4.6 G/DL (ref 3.4–5)
ALP BLD-CCNC: 50 U/L (ref 40–129)
ALT SERPL-CCNC: 34 U/L (ref 10–40)
ANION GAP SERPL CALCULATED.3IONS-SCNC: 11 MMOL/L (ref 3–16)
AST SERPL-CCNC: 24 U/L (ref 15–37)
BILIRUB SERPL-MCNC: 0.4 MG/DL (ref 0–1)
BUN BLDV-MCNC: 15 MG/DL (ref 7–20)
CALCIUM SERPL-MCNC: 8.9 MG/DL (ref 8.3–10.6)
CHLORIDE BLD-SCNC: 103 MMOL/L (ref 99–110)
CHOLESTEROL, TOTAL: 188 MG/DL (ref 0–199)
CO2: 24 MMOL/L (ref 21–32)
CREAT SERPL-MCNC: 1.5 MG/DL (ref 0.9–1.3)
GFR AFRICAN AMERICAN: >60
GFR NON-AFRICAN AMERICAN: 53
GLOBULIN: 2.8 G/DL
GLUCOSE BLD-MCNC: 97 MG/DL (ref 70–99)
HCT VFR BLD CALC: 49.7 % (ref 40.5–52.5)
HDLC SERPL-MCNC: 48 MG/DL (ref 40–60)
HEMOGLOBIN: 17.1 G/DL (ref 13.5–17.5)
LDL CHOLESTEROL CALCULATED: 123 MG/DL
MCH RBC QN AUTO: 30 PG (ref 26–34)
MCHC RBC AUTO-ENTMCNC: 34.4 G/DL (ref 31–36)
MCV RBC AUTO: 87.3 FL (ref 80–100)
PDW BLD-RTO: 13.7 % (ref 12.4–15.4)
PLATELET # BLD: 187 K/UL (ref 135–450)
PMV BLD AUTO: 8.7 FL (ref 5–10.5)
POTASSIUM SERPL-SCNC: 4.4 MMOL/L (ref 3.5–5.1)
RBC # BLD: 5.69 M/UL (ref 4.2–5.9)
SODIUM BLD-SCNC: 138 MMOL/L (ref 136–145)
TOTAL PROTEIN: 7.4 G/DL (ref 6.4–8.2)
TRIGL SERPL-MCNC: 87 MG/DL (ref 0–150)
VLDLC SERPL CALC-MCNC: 17 MG/DL
WBC # BLD: 5.7 K/UL (ref 4–11)

## 2021-04-07 ENCOUNTER — VIRTUAL VISIT (OUTPATIENT)
Dept: FAMILY MEDICINE CLINIC | Age: 36
End: 2021-04-07
Payer: COMMERCIAL

## 2021-04-07 DIAGNOSIS — R79.89 CREATININE ELEVATION: ICD-10-CM

## 2021-04-07 DIAGNOSIS — N28.9 ABNORMAL KIDNEY FUNCTION: Primary | ICD-10-CM

## 2021-04-07 DIAGNOSIS — M25.562 ACUTE PAIN OF BOTH KNEES: ICD-10-CM

## 2021-04-07 DIAGNOSIS — R42 DIZZINESS: ICD-10-CM

## 2021-04-07 DIAGNOSIS — F17.200 TOBACCO USE DISORDER: ICD-10-CM

## 2021-04-07 DIAGNOSIS — M25.561 ACUTE PAIN OF BOTH KNEES: ICD-10-CM

## 2021-04-07 DIAGNOSIS — K21.9 GASTROESOPHAGEAL REFLUX DISEASE WITHOUT ESOPHAGITIS: ICD-10-CM

## 2021-04-07 PROCEDURE — 4004F PT TOBACCO SCREEN RCVD TLK: CPT | Performed by: FAMILY MEDICINE

## 2021-04-07 PROCEDURE — G8417 CALC BMI ABV UP PARAM F/U: HCPCS | Performed by: FAMILY MEDICINE

## 2021-04-07 PROCEDURE — 99214 OFFICE O/P EST MOD 30 MIN: CPT | Performed by: FAMILY MEDICINE

## 2021-04-07 PROCEDURE — G8427 DOCREV CUR MEDS BY ELIG CLIN: HCPCS | Performed by: FAMILY MEDICINE

## 2021-04-07 RX ORDER — MECLIZINE HCL 12.5 MG/1
12.5 TABLET ORAL 3 TIMES DAILY PRN
Qty: 30 TABLET | Refills: 0 | Status: SHIPPED | OUTPATIENT
Start: 2021-04-07 | End: 2021-08-18

## 2021-04-07 ASSESSMENT — PATIENT HEALTH QUESTIONNAIRE - PHQ9
1. LITTLE INTEREST OR PLEASURE IN DOING THINGS: 0
SUM OF ALL RESPONSES TO PHQ QUESTIONS 1-9: 0
2. FEELING DOWN, DEPRESSED OR HOPELESS: 0
SUM OF ALL RESPONSES TO PHQ QUESTIONS 1-9: 0

## 2021-04-07 ASSESSMENT — ENCOUNTER SYMPTOMS
APNEA: 0
SHORTNESS OF BREATH: 0
WHEEZING: 0
STRIDOR: 0
ABDOMINAL DISTENTION: 0
CHEST TIGHTNESS: 0
RECTAL PAIN: 0
DIARRHEA: 0
BLOOD IN STOOL: 0
CHOKING: 0
EYES NEGATIVE: 1
CONSTIPATION: 0
NAUSEA: 0
VOMITING: 0
ABDOMINAL PAIN: 0
ANAL BLEEDING: 0
COUGH: 0

## 2021-04-07 NOTE — PROGRESS NOTES
Total Latest Ref Range: 0 - 199 mg/dL 188   HDL Cholesterol Latest Ref Range: 40 - 60 mg/dL 48   LDL Calculated Latest Ref Range: <130 mg/dL 123    Triglycerides Latest Ref Range: 0 - 150 mg/dL 87   VLDL Cholesterol Calculated Latest Ref Range: Not Established mg/dL 17   Albumin Latest Ref Range: 3.4 - 5.0 g/dL 4.6   Globulin Latest Units: g/dL 2.8   Albumin/Globulin Ratio Latest Ref Range: 1.1 - 2.2  1.6   Alk Phos Latest Ref Range: 40 - 129 U/L 50   ALT Latest Ref Range: 10 - 40 U/L 34   AST Latest Ref Range: 15 - 37 U/L 24   Bilirubin Latest Ref Range: 0.0 - 1.0 mg/dL 0.4   WBC Latest Ref Range: 4.0 - 11.0 K/uL 5.7   RBC Latest Ref Range: 4.20 - 5.90 M/uL 5.69   Hemoglobin Quant Latest Ref Range: 13.5 - 17.5 g/dL 17.1   Hematocrit Latest Ref Range: 40.5 - 52.5 % 49.7   MCV Latest Ref Range: 80.0 - 100.0 fL 87.3   MCH Latest Ref Range: 26.0 - 34.0 pg 30.0   MCHC Latest Ref Range: 31.0 - 36.0 g/dL 34.4   MPV Latest Ref Range: 5.0 - 10.5 fL 8.7   RDW Latest Ref Range: 12.4 - 15.4 % 13.7   Platelet Count Latest Ref Range: 135 - 450 K/uL 187       Abnml Cr & GFR. Mild GFR decrease with mild Cr elevation:new problem:see above   Associated w/nothing. Worsened(aggravated) potentially by NSAIDs:taking for his knee pain per ortho. Improves by nothing identified. Protein intake:not excessive. Denies urinary complaints/abdo pain/ynynbur-kbcklalpc-keovdbw/decreased urinary flow/sensation of incomplete voiding. GERD f/u: doing well. Takes medication (PPI:1-2tabs qd) without side effects. No other new associated/worsening or other improving factors. Denies abdo pain/n-v/diarrhea/melena-blood in stool. Bilateral right knee pain:Seen by ortho on 3/16/21:office note via EPIC reviewed today. Voltaren prescribed. Denies BLE gegqjgyd-pxjmqblzabb-jzczzjrsm/knee swelling/gait abnormality/skin lesions/knee edema. Presenting w/new complaint of mild to moderate intermittent dizziness x 3months.   Last episode today. Has episode every day. Lasts few minutes. Feels like he is spinning around the room. No prior similar sx in the past.   Associated w/nothing else. Worsened(aggravated) /triggered only when he gets aggravated. Improves by sitting still. No sx during driving. Denies ear pain/tinnitus/ear discharge-fullness/vison disturbances/HA/neck pain-stiffness/rash/fever/chills/cp/sob/presycope/syncope/ASA use/fatigue/weakness. No Known Allergies    Current Outpatient Medications on File Prior to Visit   Medication Sig Dispense Refill    gabapentin (NEURONTIN) 300 MG capsule Take 1 capsule by mouth 3 times daily for 30 days. 90 capsule 2    diclofenac (VOLTAREN) 50 MG EC tablet Take 1 tablet by mouth 2 times daily (with meals) 60 tablet 3    Diclofenac Sodium POWD 2 g by Does not apply route 4 times daily Formula #8E Baclo 2% Diclo 3% DMSO 5% Giovanna 6% Lido 2% Prilo 2% 240 g 11    pantoprazole (PROTONIX) 20 MG tablet TAKE 1 TABLET BY MOUTH EVERY DAY 90 tablet 0     No current facility-administered medications on file prior to visit. Past Medical History:   Diagnosis Date    Anxiety     Attention deficit hyperactivity disorder (ADHD), combined type     childhood per pt'.  Bipolar disorder     Bipolar disorder ([facility] [address]     under care of Dr. Kimberly Maynard)    Hepatitis C     No prior treatment    [facility]            Social History     Tobacco Use    Smoking status: Current Every Day Smoker     Packs/day: 1.00     Years: 7.00     Pack years: 7.00     Types: Cigarettes    Smokeless tobacco: Never Used   Substance Use Topics    Alcohol use: Yes     Comment: once a week     Drug use: Yes     Types: Marijuana     Social History     Substance and Sexual Activity   Drug Use Yes    Types: Marijuana             Review of Systems   Constitutional: Negative for activity change, appetite change, chills, diaphoresis, fatigue, fever and unexpected weight change. HENT: Negative.     Eyes: Negative. Respiratory: Negative for apnea, cough, choking, chest tightness, shortness of breath, wheezing and stridor. Cardiovascular: Negative for chest pain, palpitations and leg swelling. Gastrointestinal: Negative for abdominal distention, abdominal pain, anal bleeding, blood in stool, constipation, diarrhea, nausea, rectal pain and vomiting. Genitourinary: Negative for decreased urine volume, difficulty urinating, discharge, dysuria, enuresis, flank pain, frequency, genital sores, hematuria, penile pain, penile swelling, scrotal swelling, testicular pain and urgency. Skin: Negative for pallor, rash and wound. Neurological: Positive for dizziness. Negative for tremors, seizures, syncope, facial asymmetry, speech difficulty, weakness, light-headedness, numbness and headaches. Hematological: Negative for adenopathy. Psychiatric/Behavioral: Negative for agitation, behavioral problems, confusion, decreased concentration, dysphoric mood, hallucinations, self-injury, sleep disturbance and suicidal ideas. The patient is not nervous/anxious and is not hyperactive. Objective:RR=17. Physical Exam  Constitutional:       Appearance: He is well-developed. He is not toxic-appearing. Comments: Note:exam was conducted with pt' either self-palpating or visually indicating via their device camera. HENT:      Mouth/Throat:      Mouth: Mucous membranes are moist.      Pharynx: Oropharynx is clear. Uvula midline. Eyes:      General: No scleral icterus. Conjunctiva/sclera: Conjunctivae normal.   Pulmonary:      Effort: Pulmonary effort is normal.      Comments: No audible wheezing/cough/sob. Skin:     Coloration: Skin is not cyanotic. Neurological:      Mental Status: He is alert. Comments: No acute neuro deficits noted per limited exam allowed by video visit. Psychiatric:         Attention and Perception: He is attentive.          Mood and Affect: Mood normal.         Behavior: Behavior is cooperative. Assessment:        Diagnosis Orders   1. Abnormal kidney function  VSS per limited vitals obtainable via virtual visit(VV)/well appearing. Recheck lab when not fasting. Avoid NSAIDs. Advised pt' to f/u with ortho regarding alternative medication for his knees. RENAL FUNCTION PANEL   2. Dizziness  Etiology not clear. Aggravation related only per pt'. Trial of med. Possible med side effects reviewed. Pt' wishes to proceed w/med. Consult neurology. Cl Kelly MD, Neurology, [facility]    meclizine (ANTIVERT) 12.5 MG tablet   3. Creatinine elevation  See note#1. RENAL FUNCTION PANEL   4. Gastroesophageal reflux disease without esophagitis  Stable. 5. Acute pain of both knees  Per ortho. 6. Tobacco use disorder  Counseled. Strongly encouraged to quit. Plan:        5/18/21:keep psych appt. Advised to go to local ER or call 911 for any worrisome signs/sx. Call or return to clinic prn if these symptoms worsen or fail to improve as anticipated. Pt' ended call in good condition.     Rolo Pete MD

## 2021-04-09 DIAGNOSIS — R79.89 CREATININE ELEVATION: ICD-10-CM

## 2021-04-09 DIAGNOSIS — N28.9 ABNORMAL KIDNEY FUNCTION: ICD-10-CM

## 2021-04-09 LAB
ALBUMIN SERPL-MCNC: 4.3 G/DL (ref 3.4–5)
ANION GAP SERPL CALCULATED.3IONS-SCNC: 10 MMOL/L (ref 3–16)
BUN BLDV-MCNC: 17 MG/DL (ref 7–20)
CALCIUM SERPL-MCNC: 9 MG/DL (ref 8.3–10.6)
CHLORIDE BLD-SCNC: 103 MMOL/L (ref 99–110)
CO2: 25 MMOL/L (ref 21–32)
CREAT SERPL-MCNC: 1.4 MG/DL (ref 0.9–1.3)
GFR AFRICAN AMERICAN: >60
GFR NON-AFRICAN AMERICAN: 58
GLUCOSE BLD-MCNC: 95 MG/DL (ref 70–99)
PHOSPHORUS: 4.3 MG/DL (ref 2.5–4.9)
POTASSIUM SERPL-SCNC: 4.3 MMOL/L (ref 3.5–5.1)
SODIUM BLD-SCNC: 138 MMOL/L (ref 136–145)

## 2021-04-13 DIAGNOSIS — R79.89 CREATININE ELEVATION: ICD-10-CM

## 2021-04-13 DIAGNOSIS — N28.9 ABNORMAL KIDNEY FUNCTION: Primary | ICD-10-CM

## 2021-06-10 RX ORDER — GABAPENTIN 300 MG/1
300 CAPSULE ORAL 3 TIMES DAILY
Qty: 90 CAPSULE | Refills: 2 | Status: SHIPPED | OUTPATIENT
Start: 2021-06-10 | End: 2021-10-18

## 2021-06-11 ENCOUNTER — TELEPHONE (OUTPATIENT)
Dept: FAMILY MEDICINE CLINIC | Age: 36
End: 2021-06-11

## 2021-06-11 NOTE — TELEPHONE ENCOUNTER
----- Message from Helane Paget, MA sent at 6/11/2021 11:10 AM EDT -----  Subject: Appointment Request    Reason for Call: Routine Hospital Follow Up    QUESTIONS  Type of Appointment? Established Patient  Reason for appointment request? No appointments available during search  Additional Information for Provider? Discharged from [address] [place] [address] 6/8/21 for   psych. Needs follow up visit. Please call pt to schedule.   ---------------------------------------------------------------------------  --------------  CALL BACK INFO  What is the best way for the office to contact you? OK to leave message on   voicemail  Preferred Call Back Phone Number? [phone]  ---------------------------------------------------------------------------  --------------  SCRIPT ANSWERS  Relationship to Patient? Self  Appointment reason? Well Care/Follow Ups  Select a Well Care/Follow Ups appointment reason? Adult Hospital Follow Up   [Inpatient Discharge, [address]  (Patient requests to see provider urgently. )? No  (Has the patient been discharged from the hospital within 2 business days   AND does not have a Telephone Encounter  Follow Up From [address] [place] [address]   documented in [address]?)? No  Do you have any questions for your primary care provider that need to be   answered prior to your appointment? (Use RN Triage if question pertains to   anything on the red flag list)? No  (Patient needs follow up visit after hospital discharge) Book first   available appointment within 7 days OF DISCHARGE, if no appt, proceed to   book the next available time slot within 14 days OF DISCHARGE AND Send   Message to Provider. [facility] Follow Up appointment cannot be booked   beyond 14 Days and should result in a Message to Provider. ? Yes   Have you been diagnosed with, awaiting test results for, or told that you   are suspected of having COVID-19 (Coronavirus)?  (If patient has tested   negative or was tested as a requirement for work, school, or travel and   not based on symptoms, answer no)? No  Do you currently have flu-like symptoms including fever or chills, cough,   shortness of breath, difficulty breathing, or new loss of taste or smell? No  Have you had close contact with someone with COVID-19 in the last 14 days? No  (Service Expert  click yes below to proceed with [employer]ual   Scheduling)?  Yes

## 2021-06-25 DIAGNOSIS — K21.9 GASTROESOPHAGEAL REFLUX DISEASE WITHOUT ESOPHAGITIS: ICD-10-CM

## 2021-06-25 RX ORDER — PANTOPRAZOLE SODIUM 20 MG/1
TABLET, DELAYED RELEASE ORAL
Qty: 90 TABLET | Refills: 0 | Status: SHIPPED | OUTPATIENT
Start: 2021-06-25 | End: 2021-08-18 | Stop reason: SDUPTHER

## 2021-08-17 ENCOUNTER — TELEPHONE (OUTPATIENT)
Dept: FAMILY MEDICINE CLINIC | Age: 36
End: 2021-08-17

## 2021-08-17 NOTE — TELEPHONE ENCOUNTER
----- Message from Vish Adama sent at 8/16/2021  3:39 PM EDT -----  Subject: Message to Provider    QUESTIONS  Information for Provider? Patient quit smoking for about a year then got   diagnosed with Bi polar and is taking medication. He is wondering if he   can get a prescription for nicotine patches.   ---------------------------------------------------------------------------  --------------  CALL BACK INFO  What is the best way for the office to contact you? OK to leave message on   voicemail  Preferred Call Back Phone Number? [phone]  ---------------------------------------------------------------------------  --------------  SCRIPT ANSWERS  Relationship to Patient?  Self

## 2021-08-18 ENCOUNTER — TELEMEDICINE (OUTPATIENT)
Dept: FAMILY MEDICINE CLINIC | Age: 36
End: 2021-08-18
Payer: COMMERCIAL

## 2021-08-18 DIAGNOSIS — Z72.0 SMOKING TRYING TO QUIT: Primary | ICD-10-CM

## 2021-08-18 DIAGNOSIS — K21.9 GASTROESOPHAGEAL REFLUX DISEASE WITHOUT ESOPHAGITIS: ICD-10-CM

## 2021-08-18 PROCEDURE — 99422 OL DIG E/M SVC 11-20 MIN: CPT | Performed by: NURSE PRACTITIONER

## 2021-08-18 RX ORDER — TRAZODONE HYDROCHLORIDE 50 MG/1
100 TABLET ORAL NIGHTLY
COMMUNITY
Start: 2021-08-13 | End: 2022-08-01 | Stop reason: SDUPTHER

## 2021-08-18 RX ORDER — PANTOPRAZOLE SODIUM 20 MG/1
TABLET, DELAYED RELEASE ORAL
Qty: 90 TABLET | Refills: 0 | Status: SHIPPED | OUTPATIENT
Start: 2021-08-18

## 2021-08-18 RX ORDER — DIVALPROEX SODIUM 500 MG/1
1000 TABLET, DELAYED RELEASE ORAL 2 TIMES DAILY
COMMUNITY
Start: 2021-08-02 | End: 2022-07-25 | Stop reason: SDUPTHER

## 2021-08-18 RX ORDER — NICOTINE 21 MG/24HR
1 PATCH, TRANSDERMAL 24 HOURS TRANSDERMAL DAILY
Qty: 42 PATCH | Refills: 0 | Status: SHIPPED | OUTPATIENT
Start: 2021-08-18 | End: 2021-10-15

## 2021-08-18 NOTE — PROGRESS NOTES
2021    TELEHEALTH EVALUATION -- Audio/Visual (During AWHGY-82 public health emergency)    HPI:  He has started smoking again due to different changes and stresors in his life, He does wifh to stop because he says he feels better when he stop. He Smokes pack a day. He does want to set a quit date of Quit date 6 weeks from today. He says he recognizes he coughs up more mucous. Provided education on benefits of quitting and effects on overall health and benefits for treatments coming up. Brooke Power (:  1985) has requested an audio/video evaluation for the following concern(s):    Smoking Cessation    Review of Systems   All other systems reviewed and are negative. Prior to Visit Medications    Medication Sig Taking? Authorizing Provider   divalproex (DEPAKOTE) 500 MG DR tablet Take 500 mg by mouth 2 times daily Yes Historical Provider, MD   traZODone (DESYREL) 50 MG tablet Take 100 mg by mouth nightly Yes Historical Provider, MD   pantoprazole (PROTONIX) 20 MG tablet TAKE 1 TABLET BY MOUTH EVERY DAY Yes BERTHA Key   nicotine (NICODERM CQ) 21 MG/24HR Place 1 patch onto the skin daily Yes BERTHA Key   gabapentin (NEURONTIN) 300 MG capsule TAKE 1 CAPSULE BY MOUTH 3 TIMES DAILY FOR 30 DAYS.  Yes JAELYN Amaya       Social History     Tobacco Use    Smoking status: Current Every Day Smoker     Packs/day: 1.00     Years: 7.00     Pack years: 7.00     Types: Cigarettes    Smokeless tobacco: Never Used   Vaping Use    Vaping Use: Never assessed   Substance Use Topics    Alcohol use: Yes     Comment: once a week     Drug use: Yes     Types: Marijuana            PHYSICAL EXAMINATION:  [ INSTRUCTIONS:  \"[x]\" Indicates a positive item  \"[]\" Indicates a negative item  -- DELETE ALL ITEMS NOT EXAMINED]  Vital Signs: (As obtained by patient/caregiver or practitioner observation)    Blood pressure-  Heart rate-    Respiratory rate-    Temperature-  Pulse oximetry- Constitutional: [x] Appears well-developed and well-nourished [] No apparent distress      [] Abnormal-   Mental status  [x] Alert and awake  [x] Oriented to person/place/time []Able to follow commands      Eyes:  EOM    [x]  Normal  [] Abnormal-      HENT:   [x] Normocephalic, atraumatic. [] Abnormal       External Ears [x] Normal  [] Abnormal-     Neck: [x] No visualized mass     Pulmonary/Chest: [x] Respiratory effort normal.  [x] No visualized signs of difficulty breathing or respiratory distress        [] Abnormal-      Musculoskeletal:   [x] Normal gait with no signs of ataxia         [x] Normal range of motion of neck        [] Abnormal-       Neurological:        [x] No Facial Asymmetry (Cranial nerve 7 motor function) (limited exam to video visit)          [] No gaze palsy        [] Abnormal-         Skin:        [x] No significant exanthematous lesions or discoloration noted on facial skin         [] Abnormal-            Psychiatric:       [x] Normal Affect [] No Hallucinations        [] Abnormal-     Other pertinent observable physical exam findings-     ASSESSMENT/PLAN:  1. Smoking trying to quit  Quit date set for 6 weeks from today  - nicotine (NICODERM CQ) 21 MG/24HR; Place 1 patch onto the skin daily  Dispense: 42 patch; Refill: 0    2. Gastroesophageal reflux disease without esophagitis  Reduce acidic foods in diet, sit up 30 min after meals, avoid late night eating  - pantoprazole (PROTONIX) 20 MG tablet; TAKE 1 TABLET BY MOUTH EVERY DAY  Dispense: 90 tablet; Refill: 0      No follow-ups on file. Desiree Vilakamilah, was evaluated through a synchronous (real-time) audio-video encounter. The patient (or guardian if applicable) is aware that this is a billable service. Verbal consent to proceed has been obtained within the past 12 months.  The visit was conducted pursuant to the emergency declaration under the [facility] waiver authority and the Coronavirus Preparedness and Response Supplemental Appropriations Act. Patient identification was verified, and a caregiver was present when appropriate. The patient was located in a state where the provider was credentialed to provide care. Total time spent on this encounter: Not billed by time    --BERTHA Thakur on 8/18/2021 at 8:26 AM    An electronic signature was used to authenticate this note.

## 2021-10-14 DIAGNOSIS — Z72.0 SMOKING TRYING TO QUIT: ICD-10-CM

## 2021-10-15 RX ORDER — NICOTINE 21 MG/24HR
PATCH, TRANSDERMAL 24 HOURS TRANSDERMAL
Qty: 28 PATCH | Refills: 1 | Status: SHIPPED | OUTPATIENT
Start: 2021-10-15

## 2021-10-18 RX ORDER — GABAPENTIN 300 MG/1
300 CAPSULE ORAL 3 TIMES DAILY
Qty: 90 CAPSULE | Refills: 2 | Status: SHIPPED | OUTPATIENT
Start: 2021-10-18 | End: 2022-08-01

## 2022-07-11 ENCOUNTER — OFFICE VISIT (OUTPATIENT)
Dept: INTERNAL MEDICINE CLINIC | Age: 37
End: 2022-07-11
Payer: COMMERCIAL

## 2022-07-11 VITALS
BODY MASS INDEX: 27.68 KG/M2 | SYSTOLIC BLOOD PRESSURE: 120 MMHG | HEIGHT: 68 IN | OXYGEN SATURATION: 98 % | DIASTOLIC BLOOD PRESSURE: 72 MMHG | HEART RATE: 77 BPM | WEIGHT: 182.63 LBS

## 2022-07-11 DIAGNOSIS — N18.9 CHRONIC KIDNEY DISEASE, UNSPECIFIED CKD STAGE: Primary | ICD-10-CM

## 2022-07-11 DIAGNOSIS — F31.60 BIPOLAR AFFECTIVE DISORDER, CURRENT EPISODE MIXED, CURRENT EPISODE SEVERITY UNSPECIFIED (HCC): ICD-10-CM

## 2022-07-11 DIAGNOSIS — B19.20 HEPATITIS C VIRUS INFECTION WITHOUT HEPATIC COMA, UNSPECIFIED CHRONICITY: ICD-10-CM

## 2022-07-11 DIAGNOSIS — E78.2 MIXED HYPERLIPIDEMIA: ICD-10-CM

## 2022-07-11 PROCEDURE — G8427 DOCREV CUR MEDS BY ELIG CLIN: HCPCS | Performed by: INTERNAL MEDICINE

## 2022-07-11 PROCEDURE — 99214 OFFICE O/P EST MOD 30 MIN: CPT | Performed by: INTERNAL MEDICINE

## 2022-07-11 PROCEDURE — 1036F TOBACCO NON-USER: CPT | Performed by: INTERNAL MEDICINE

## 2022-07-11 PROCEDURE — G8417 CALC BMI ABV UP PARAM F/U: HCPCS | Performed by: INTERNAL MEDICINE

## 2022-07-11 RX ORDER — RISPERIDONE 0.5 MG/1
0.5 TABLET, FILM COATED ORAL EVERY EVENING
COMMUNITY
Start: 2021-06-08 | End: 2022-08-01 | Stop reason: SDUPTHER

## 2022-07-11 RX ORDER — LORAZEPAM 1 MG/1
TABLET ORAL
COMMUNITY
End: 2022-08-01

## 2022-07-11 SDOH — HEALTH STABILITY: PHYSICAL HEALTH: ON AVERAGE, HOW MANY DAYS PER WEEK DO YOU ENGAGE IN MODERATE TO STRENUOUS EXERCISE (LIKE A BRISK WALK)?: 5 DAYS

## 2022-07-11 SDOH — ECONOMIC STABILITY: FOOD INSECURITY: WITHIN THE PAST 12 MONTHS, THE FOOD YOU BOUGHT JUST DIDN'T LAST AND YOU DIDN'T HAVE MONEY TO GET MORE.: OFTEN TRUE

## 2022-07-11 SDOH — HEALTH STABILITY: PHYSICAL HEALTH: ON AVERAGE, HOW MANY MINUTES DO YOU ENGAGE IN EXERCISE AT THIS LEVEL?: 120 MIN

## 2022-07-11 SDOH — ECONOMIC STABILITY: FOOD INSECURITY: WITHIN THE PAST 12 MONTHS, YOU WORRIED THAT YOUR FOOD WOULD RUN OUT BEFORE YOU GOT MONEY TO BUY MORE.: OFTEN TRUE

## 2022-07-11 ASSESSMENT — SOCIAL DETERMINANTS OF HEALTH (SDOH)
WITHIN THE LAST YEAR, HAVE YOU BEEN KICKED, HIT, SLAPPED, OR OTHERWISE PHYSICALLY HURT BY YOUR PARTNER OR EX-PARTNER?: NO
WITHIN THE LAST YEAR, HAVE TO BEEN RAPED OR FORCED TO HAVE ANY KIND OF SEXUAL ACTIVITY BY YOUR PARTNER OR EX-PARTNER?: NO
WITHIN THE LAST YEAR, HAVE YOU BEEN HUMILIATED OR EMOTIONALLY ABUSED IN OTHER WAYS BY YOUR PARTNER OR EX-PARTNER?: NO
HOW HARD IS IT FOR YOU TO PAY FOR THE VERY BASICS LIKE FOOD, HOUSING, MEDICAL CARE, AND HEATING?: SOMEWHAT HARD
WITHIN THE LAST YEAR, HAVE YOU BEEN AFRAID OF YOUR PARTNER OR EX-PARTNER?: NO

## 2022-07-11 ASSESSMENT — PATIENT HEALTH QUESTIONNAIRE - PHQ9
SUM OF ALL RESPONSES TO PHQ QUESTIONS 1-9: 2
SUM OF ALL RESPONSES TO PHQ QUESTIONS 1-9: 2
SUM OF ALL RESPONSES TO PHQ9 QUESTIONS 1 & 2: 2
2. FEELING DOWN, DEPRESSED OR HOPELESS: 0
1. LITTLE INTEREST OR PLEASURE IN DOING THINGS: 2
SUM OF ALL RESPONSES TO PHQ QUESTIONS 1-9: 2
SUM OF ALL RESPONSES TO PHQ QUESTIONS 1-9: 2

## 2022-07-11 NOTE — PROGRESS NOTES
[facility]  Internal Medicine  New Patient Note  Karla MoreauDO      2022    Chel Adkins (:  1985) is a 39 y.o. male, here for evaluation of the following medical concerns:      SUBJECTIVE:    HPI  Patient is a 25-year-old male with past medical history of bipolar disorder, hepatitis C, questionable schizophrenia and CKD who presents secondary to follow-up for his chronic diseases and to establish care. Patient notes that he was with a psychiatrist for some time however was dismissed. He notes that he can no longer go there. He notes that he will be running out of his medications soon. He currently takes trazodone, Depakote, Risperdal and Ativan. He has been out of his Ativan for a little bit now. He notes that he is managing without it with some anxiety. He notes that he follows with nephrology for his chronic kidney disease. He notes that this has been stable. He was supposed to see hepatology for hepatitis C however has not thus far. He is otherwise feeling well  Review of Systems ROS negative except for those noted in the HPI above. Outpatient Medications Marked as Taking for the 22 encounter (Office Visit) with Mark Meek, DO   Medication Sig Dispense Refill    LORazepam (ATIVAN) 1 MG tablet       risperiDONE (RISPERDAL) 0.5 MG tablet Take 0.5 mg by mouth every evening      nicotine (NICODERM CQ) 21 MG/24HR APPLY 1 PATCH ONTO THE SKIN EVERY DAY 28 patch 1    divalproex (DEPAKOTE) 500 MG DR tablet Take 1,000 mg by mouth in the morning and at bedtime Take 1 tab in the morning and 2 tabs in the evening      traZODone (DESYREL) 50 MG tablet Take 100 mg by mouth nightly          No Known Allergies    Past Medical History:   Diagnosis Date    Anxiety     Attention deficit hyperactivity disorder (ADHD), combined type     childhood per pt'.     Bipolar disorder     Bipolar disorder ([facility] [address]     under care of Dr. Ras Morocho)    Chronic kidney disease     Depression     Hepatitis C     No prior treatment    Mixed hyperlipidemia 7/12/2022    Schizophrenia [facility])        Past Surgical History:   Procedure Laterality Date    DENTAL SURGERY         Social History     Socioeconomic History    Marital status:      Spouse name: Not on file    Number of children: Not on file    Years of education: Not on file    Highest education level: Not on file   Occupational History    Not on file   Tobacco Use    Smoking status: Former Smoker     Packs/day: 1.00     Years: 7.00     Pack years: 7.00     Types: Cigarettes    Smokeless tobacco: Never Used   Vaping Use    Vaping Use: Not on file   Substance and Sexual Activity    Alcohol use: Yes     Comment: once a week     Drug use: Yes     Types: Marijuana [place])    Sexual activity: Yes     Comment: single-2 children    Other Topics Concern    Not on file   Social History Narrative    Not on file     Social Determinants of Health     Financial Resource Strain: Medium Risk    Difficulty of Paying Living Expenses: Somewhat hard   Food Insecurity: Food Insecurity Present    Worried About Running Out of Food in the Last Year: Often true    [place] of Food in the Last Year: Often true   Transportation Needs:     Lack of Transportation (Medical): Not on file    Lack of Transportation (Non-Medical):  Not on file   Physical Activity: Sufficiently Active    Days of Exercise per Week: 5 days    Minutes of Exercise per Session: 120 min   Stress:     Feeling of Stress : Not on file   Social Connections:     Frequency of Communication with Friends and Family: Not on file    Frequency of Social Gatherings with Friends and Family: Not on file    Attends Adventism Services: Not on file    Active Member of Clubs or Organizations: Not on file    Attends Club or Organization Meetings: Not on file    Marital Status: Not on file   Intimate Partner Violence: Not At Risk    Fear of Current or Ex-Partner: No    present. Mental Status: He is alert and oriented to person, place, and time. Psychiatric:         Mood and Affect: Mood normal.         Behavior: Behavior normal.         ASSESSMENT/PLAN:  1. Chronic kidney disease, unspecified CKD stage  Patient to continue following with nephrology. Check CMP. Avoid nephrotoxic medications including ibuprofen. - Comprehensive Metabolic Panel; Future    2. Bipolar affective disorder, current episode mixed, current episode severity unspecified ([address]  Longtime history of bipolar disorder. Patient has been hospitalized in the past.  Continue current medications and concluding Depakote, Risperdal and trazodone. We will check a valproic acid level. Patient will be set up with psychiatry for further management of these medications. Of note will fill patient's medications to bridge him to his psychiatry appointment if needed. - Valproic Acid Level, Total and Free; Future  - Ambulatory referral to Psychiatry    3. Mixed hyperlipidemia  History of elevated cholesterol. Check lipid panel.  - Lipid Panel; Future    4. Hepatitis C virus infection without hepatic coma, unspecified chronicity  History of hepatitis C. Patient has never seeing hepatology. Referral given.   Check CMP.  - DANYELLE - Todd Nunez MD, Gastroenterology, [facility]         Return in about 4 weeks (around 8/8/2022) for annual physical .     Rosie Parsons,

## 2022-07-12 PROBLEM — E78.2 MIXED HYPERLIPIDEMIA: Status: ACTIVE | Noted: 2022-07-12

## 2022-07-12 PROBLEM — B19.20 HEPATITIS C VIRUS INFECTION WITHOUT HEPATIC COMA: Status: ACTIVE | Noted: 2022-07-12

## 2022-07-12 PROBLEM — F31.60 BIPOLAR AFFECTIVE DISORDER, CURRENT EPISODE MIXED (HCC): Status: ACTIVE | Noted: 2022-07-12

## 2022-07-12 PROBLEM — N18.9 CHRONIC KIDNEY DISEASE: Status: ACTIVE | Noted: 2022-07-12

## 2022-07-25 RX ORDER — DIVALPROEX SODIUM 500 MG/1
1000 TABLET, DELAYED RELEASE ORAL 2 TIMES DAILY
Qty: 60 TABLET | Refills: 0 | Status: SHIPPED | OUTPATIENT
Start: 2022-07-25 | End: 2022-07-29 | Stop reason: SDUPTHER

## 2022-07-25 NOTE — TELEPHONE ENCOUNTER
Medication:   Requested Prescriptions     Pending Prescriptions Disp Refills    divalproex (DEPAKOTE) 500 MG DR tablet 60 tablet 0     Sig: Take 2 tablets by mouth in the morning and at bedtime Take 1 tab in the morning and 2 tabs in the evening     Last Filled:  8/2/21    Last appt: 7/11/2022   Next appt: 8/17/2022  Patient is asking for short term fill till he can see psych on 8/1

## 2022-07-29 ENCOUNTER — TELEPHONE (OUTPATIENT)
Dept: INTERNAL MEDICINE CLINIC | Age: 37
End: 2022-07-29

## 2022-07-29 RX ORDER — DIVALPROEX SODIUM 500 MG/1
1000 TABLET, DELAYED RELEASE ORAL 2 TIMES DAILY
Qty: 60 TABLET | Refills: 0 | Status: SHIPPED | OUTPATIENT
Start: 2022-07-29 | End: 2022-08-01 | Stop reason: SDUPTHER

## 2022-08-01 ENCOUNTER — OFFICE VISIT (OUTPATIENT)
Dept: PSYCHIATRY | Age: 37
End: 2022-08-01
Payer: COMMERCIAL

## 2022-08-01 VITALS
WEIGHT: 184.4 LBS | SYSTOLIC BLOOD PRESSURE: 102 MMHG | DIASTOLIC BLOOD PRESSURE: 70 MMHG | BODY MASS INDEX: 28.04 KG/M2 | HEART RATE: 65 BPM

## 2022-08-01 DIAGNOSIS — F34.1 DYSTHYMIA: ICD-10-CM

## 2022-08-01 DIAGNOSIS — F40.10 SOCIAL ANXIETY DISORDER: ICD-10-CM

## 2022-08-01 DIAGNOSIS — F60.3 BORDERLINE PERSONALITY DISORDER (HCC): Primary | ICD-10-CM

## 2022-08-01 PROCEDURE — 90833 PSYTX W PT W E/M 30 MIN: CPT | Performed by: STUDENT IN AN ORGANIZED HEALTH CARE EDUCATION/TRAINING PROGRAM

## 2022-08-01 PROCEDURE — G8417 CALC BMI ABV UP PARAM F/U: HCPCS | Performed by: STUDENT IN AN ORGANIZED HEALTH CARE EDUCATION/TRAINING PROGRAM

## 2022-08-01 PROCEDURE — 1036F TOBACCO NON-USER: CPT | Performed by: STUDENT IN AN ORGANIZED HEALTH CARE EDUCATION/TRAINING PROGRAM

## 2022-08-01 PROCEDURE — G8427 DOCREV CUR MEDS BY ELIG CLIN: HCPCS | Performed by: STUDENT IN AN ORGANIZED HEALTH CARE EDUCATION/TRAINING PROGRAM

## 2022-08-01 PROCEDURE — 99204 OFFICE O/P NEW MOD 45 MIN: CPT | Performed by: STUDENT IN AN ORGANIZED HEALTH CARE EDUCATION/TRAINING PROGRAM

## 2022-08-01 RX ORDER — RISPERIDONE 0.5 MG/1
0.5 TABLET, FILM COATED ORAL EVERY EVENING
Qty: 30 TABLET | Refills: 1 | Status: SHIPPED | OUTPATIENT
Start: 2022-08-01 | End: 2022-10-01 | Stop reason: SDUPTHER

## 2022-08-01 RX ORDER — TRAZODONE HYDROCHLORIDE 50 MG/1
100 TABLET ORAL NIGHTLY
Qty: 30 TABLET | Refills: 2 | Status: SHIPPED | OUTPATIENT
Start: 2022-08-01 | End: 2022-10-08 | Stop reason: SDUPTHER

## 2022-08-01 RX ORDER — LORAZEPAM 1 MG/1
1 TABLET ORAL 2 TIMES DAILY
Qty: 30 TABLET | Refills: 0 | Status: SHIPPED | OUTPATIENT
Start: 2022-08-01 | End: 2022-08-22

## 2022-08-01 RX ORDER — DIVALPROEX SODIUM 500 MG/1
1000 TABLET, DELAYED RELEASE ORAL 2 TIMES DAILY
Qty: 120 TABLET | Refills: 2 | Status: SHIPPED | OUTPATIENT
Start: 2022-08-01 | End: 2022-11-03 | Stop reason: SDUPTHER

## 2022-08-01 ASSESSMENT — ANXIETY QUESTIONNAIRES
IF YOU CHECKED OFF ANY PROBLEMS ON THIS QUESTIONNAIRE, HOW DIFFICULT HAVE THESE PROBLEMS MADE IT FOR YOU TO DO YOUR WORK, TAKE CARE OF THINGS AT HOME, OR GET ALONG WITH OTHER PEOPLE: EXTREMELY DIFFICULT
1. FEELING NERVOUS, ANXIOUS, OR ON EDGE: 3
2. NOT BEING ABLE TO STOP OR CONTROL WORRYING: 3
3. WORRYING TOO MUCH ABOUT DIFFERENT THINGS: 3
6. BECOMING EASILY ANNOYED OR IRRITABLE: 3
5. BEING SO RESTLESS THAT IT IS HARD TO SIT STILL: 3
4. TROUBLE RELAXING: 3
7. FEELING AFRAID AS IF SOMETHING AWFUL MIGHT HAPPEN: 2
GAD7 TOTAL SCORE: 20

## 2022-08-01 ASSESSMENT — PATIENT HEALTH QUESTIONNAIRE - PHQ9
6. FEELING BAD ABOUT YOURSELF - OR THAT YOU ARE A FAILURE OR HAVE LET YOURSELF OR YOUR FAMILY DOWN: 3
9. THOUGHTS THAT YOU WOULD BE BETTER OFF DEAD, OR OF HURTING YOURSELF: 2
2. FEELING DOWN, DEPRESSED OR HOPELESS: 2
4. FEELING TIRED OR HAVING LITTLE ENERGY: 1
SUM OF ALL RESPONSES TO PHQ9 QUESTIONS 1 & 2: 4
SUM OF ALL RESPONSES TO PHQ QUESTIONS 1-9: 20
7. TROUBLE CONCENTRATING ON THINGS, SUCH AS READING THE NEWSPAPER OR WATCHING TELEVISION: 3
3. TROUBLE FALLING OR STAYING ASLEEP: 3
SUM OF ALL RESPONSES TO PHQ QUESTIONS 1-9: 22
SUM OF ALL RESPONSES TO PHQ QUESTIONS 1-9: 22
8. MOVING OR SPEAKING SO SLOWLY THAT OTHER PEOPLE COULD HAVE NOTICED. OR THE OPPOSITE, BEING SO FIGETY OR RESTLESS THAT YOU HAVE BEEN MOVING AROUND A LOT MORE THAN USUAL: 3
SUM OF ALL RESPONSES TO PHQ QUESTIONS 1-9: 22
10. IF YOU CHECKED OFF ANY PROBLEMS, HOW DIFFICULT HAVE THESE PROBLEMS MADE IT FOR YOU TO DO YOUR WORK, TAKE CARE OF THINGS AT HOME, OR GET ALONG WITH OTHER PEOPLE: 3
1. LITTLE INTEREST OR PLEASURE IN DOING THINGS: 2
5. POOR APPETITE OR OVEREATING: 3

## 2022-08-01 ASSESSMENT — ENCOUNTER SYMPTOMS
GASTROINTESTINAL NEGATIVE: 1
ALLERGIC/IMMUNOLOGIC NEGATIVE: 1
RESPIRATORY NEGATIVE: 1
EYES NEGATIVE: 1
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
## 2022-08-01 NOTE — TELEPHONE ENCOUNTER
Lvm to make pt aware that RX for Depakote taking 2 times daily was called into his pharmacy on 07/29/2022

## 2022-08-22 DIAGNOSIS — F40.10 SOCIAL ANXIETY DISORDER: ICD-10-CM

## 2022-08-22 RX ORDER — LORAZEPAM 1 MG/1
1 TABLET ORAL 2 TIMES DAILY PRN
Qty: 30 TABLET | Refills: 0 | Status: SHIPPED | OUTPATIENT
Start: 2022-08-22 | End: 2022-09-07

## 2022-08-31 NOTE — PROGRESS NOTES
PSYCHIATRY PROGRESS NOTE    Ellen Hernandez  1985  09/01/22  Face to Face time: 30 minutes  PCP: Chacha Burch DO    CC:   Chief Complaint   Patient presents with    Follow-up    Depression     Patient is a 42-year-old male with a past medical history significant for hepatitis C, hyperlipidemia, and chronic kidney disease presents the outpatient psychiatric clinic today for evaluation and management of depression and anxiety. Ellen Hernandez, was evaluated through a synchronous (real-time) audio-video encounter. The patient (or guardian if applicable) is aware that this is a billable service, which includes applicable co-pays. This Virtual Visit was conducted with patient's (and/or legal guardian's) consent. The visit was conducted pursuant to the emergency declaration under the [address] [place] [address], [address] [place] [address] authority and the [employer] Act. Patient identification was verified, and a caregiver was present when appropriate. The patient was located at Home: [facility]  [place] [address]. Provider was located at [facility] (Appt Dept): [address] [place] [address],  [address] [place] [address]. A:  Patient's presentation today is indicative of some difficulties with his dysthymia that do merit additional management at this time. Diagnosis:  Dysthymia  Borderline Personality Disorder  Social anxiety disorder  Childhood ADHD by history    P:   We will plan to maintain the patient on his current regimen of lorazepam 1mg bid PRN, depakote 1000mg bid, trazodone 100mg qhs, and risperidone 0.5mg qhs. We will plan to trial the patient on fluoxetine 20mg daily for depressive symptoms. Pt was advised of potential SE including nausea, vomiting, diarrhea, as well as increased frequency of falls and/or suicidal ideations. Medication Monitoring:    - PDMP reviewed: Lorazepam 1mg bid 15-day supply filled 8/22/2022. Current cannabinoid scripts. Follow-up: 4 weeks    Safety: Pt was counseled on the potential for increased suicidal ideations and advised on potential options for dealing with these including hotlines, calling the office, or going to the nearest emergency room. __________________________________________________________________________    S:   The patient noted that he'd experienced a bunch of adverse events in his life. 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 He noted that he's \"always paranoid\", believing that people are listening to him and his discussions. He still believes that family may be conspiring against him at times. Difficulties with his brain \"not being able to stop going\". He indicated that one of the biggest parts of drama he has to deal with is with his father when he's drunk and \"talking crap about me\". Still lots of problems in coping with this. The patient denied any SI/HI/AVH. ROS:  Review of Systems   Constitutional: Negative. HENT: Negative. Eyes: Negative. Respiratory: Negative. Cardiovascular: Negative. Gastrointestinal: Negative. Endocrine: Negative. Genitourinary: Negative. Musculoskeletal: Negative. Skin: Negative. Allergic/Immunologic: Negative. Neurological: Negative. Hematological: Negative. Psychiatric/Behavioral:  Positive for dysphoric mood. The patient is nervous/anxious. Positive for paranoia.       Brief Medical Hx:   Patient Active Problem List   Diagnosis    Patient overweight    Hepatitis C virus infection without hepatic coma    Mixed hyperlipidemia    Chronic kidney disease        Brief Psych Hx:  Hosp: 1x in 5/2021 when he was in the hospital 9 days for SI  Diagnoses: Childhood ADHD, anxiety, bipolar disorder, depression, schizophrenia per charting  Med trials: Quetiapine, sertraline (paced for 14h)  Outpt: Previously has engaged with psychiatrists and counselors. Last psychiatrist he was seeing he was fired from for missing appointments  NSSI: Cutting behaviors as recent as 2020. The patient does have significant scarring from these. Suicide Attempts: Identified 6 definable times when he attempted suicide in the past, though has had thoughts since he was 12-13. O:  Wt Readings from Last 3 Encounters:   08/01/22 184 lb 6.4 oz (83.6 kg)   07/11/22 182 lb 10.1 oz (82.8 kg)   11/22/21 180 lb (81.6 kg)       There were no vitals filed for this visit. This is secondary to it being a virtual visit. Mental Status Exam:   Appearance:    Appropriately dressed  Motor: No abnormal movements, tics or mannerisms.   Eye Contact: Good for video  Speech:    Appropriate rate and rhythm  Language:   Appropriate diction  Mood/Affect:  \"More depressed\"/ Some blunting of affect apparent  Thought Process:   Linear, logical  Thought Content:    Some additional depressive content present, no SI/HI  Hallucinations:   Denied, not seen to be responding to IS  Associations:   Intact  Attention/Concentration:   Intact  Orientation:    Alert and oriented x4  Memory:   Intact  Fund of Knowledge:    Appropriate for age and education  Insight/Judgement:   Intact/intact      BRANDON-7 SCREENING 9/1/2022 8/1/2022   Feeling nervous, anxious, or on edge More than half the days Nearly every day   Not being able to stop or control worrying Nearly every day Nearly every day   Worrying too much about different things Nearly every day Nearly every day   Trouble relaxing Nearly every day Nearly every day   Being so restless that it is hard to sit still Nearly every day Nearly every day   Becoming easily annoyed or irritable Nearly every day Nearly every day   Feeling afraid as if something awful might happen Nearly every day More than half the days   BRANDON-7 Total Score 20 20   How difficult have these problems made it for you to do your work, take care of things at home, or get along with other people? Extremely difficult Extremely difficult     PHQ-9 Questionaire 9/1/2022 8/1/2022   Little interest or pleasure in doing things 3 2   Feeling down, depressed, or hopeless 3 2   Trouble falling or staying asleep, or sleeping too much 3 3   Feeling tired or having little energy 3 1   Poor appetite or overeating 3 3   Feeling bad about yourself - or that you are a failure or have let yourself or your family down 2 3   Trouble concentrating on things, such as reading the newspaper or watching television 3 3   Moving or speaking so slowly that other people could have noticed. Or the opposite - being so fidgety or restless that you have been moving around a lot more than usual 0 3   Thoughts that you would be better off dead, or of hurting yourself in some way 0 2   PHQ-9 Total Score 20 22   If you checked off any problems, how difficult have these problems made it for you to do your work, take care of things at home, or get along with other people?  3 3      Labs:     Orders Only on 07/28/2022   Component Date Value Ref Range Status    Cholesterol, Total 07/28/2022 142  0 - 199 mg/dL Final    Triglycerides 07/28/2022 101  0 - 150 mg/dL Final    HDL 07/28/2022 42  40 - 60 mg/dL Final    LDL Calculated 07/28/2022 80  <100 mg/dL Final    VLDL Cholesterol Calculated 07/28/2022 20  Not Established mg/dL Final    Valproic Acid, Free 07/28/2022 5.7 (A)  7.0 - 23.0 ug/mL Final    Valproic Acid Lvl 07/28/2022 59  50 - 125 ug/mL Corrected    [facility] [address] [address] [phone]    Valproic Dose amount 07/28/2022 UNK   Final    Valproic Date last dose 07/28/2022 UNK   Final    Valproic Time last dose 07/28/2022 UNK   Final    Valproic Acid % Free 07/28/2022 9.7  5.0 - 18.4 % Final    [facility] [address] [address] [phone]    Sodium 07/28/2022 141  136 - 145 mmol/L Final    Potassium 07/28/2022 4.3  3.5 - 5.1 mmol/L Final    Chloride 07/28/2022 105  99 - 110 mmol/L Final    CO2 07/28/2022 25  21 - 32 mmol/L Final    Anion Gap 07/28/2022 11  3 - 16 Final    Glucose 07/28/2022 105 (A)  70 - 99 mg/dL Final    BUN 07/28/2022 15  7 - 20 mg/dL Final    Creatinine 07/28/2022 1.1  0.9 - 1.3 mg/dL Final    GFR Non- 07/28/2022 >60  >60 Final    Comment: >60 mL/min/1.73m2 EGFR, calc. for ages 25 and older using the  MDRD formula (not corrected for weight), is valid for stable  renal function. GFR  07/28/2022 >60  >60 Final    Comment: Chronic Kidney Disease: less than 60 ml/min/1.73 sq.m. Kidney Failure: less than 15 ml/min/1.73 sq.m. Results valid for patients 18 years and older.       Calcium 07/28/2022 8.6  8.3 - 10.6 mg/dL Final    Total Protein 07/28/2022 6.4  6.4 - 8.2 g/dL Final    Albumin 07/28/2022 4.0  3.4 - 5.0 g/dL Final    Albumin/Globulin Ratio 07/28/2022 1.7  1.1 - 2.2 Final    Total Bilirubin 07/28/2022 0.4  0.0 - 1.0 mg/dL Final    Alkaline Phosphatase 07/28/2022 44  40 - 129 U/L Final    ALT 07/28/2022 27  10 - 40 U/L Final    AST 07/28/2022 22  15 - 37 U/L Final       EKG: None on file        Jeison Gayle MD  Psychiatrist

## 2022-09-01 ENCOUNTER — TELEMEDICINE (OUTPATIENT)
Dept: PSYCHIATRY | Age: 37
End: 2022-09-01
Payer: COMMERCIAL

## 2022-09-01 DIAGNOSIS — F34.1 DYSTHYMIA: Primary | ICD-10-CM

## 2022-09-01 DIAGNOSIS — F60.3 BORDERLINE PERSONALITY DISORDER (HCC): ICD-10-CM

## 2022-09-01 DIAGNOSIS — F40.10 SOCIAL ANXIETY DISORDER: ICD-10-CM

## 2022-09-01 PROCEDURE — 99214 OFFICE O/P EST MOD 30 MIN: CPT | Performed by: STUDENT IN AN ORGANIZED HEALTH CARE EDUCATION/TRAINING PROGRAM

## 2022-09-01 PROCEDURE — 1036F TOBACCO NON-USER: CPT | Performed by: STUDENT IN AN ORGANIZED HEALTH CARE EDUCATION/TRAINING PROGRAM

## 2022-09-01 PROCEDURE — G8427 DOCREV CUR MEDS BY ELIG CLIN: HCPCS | Performed by: STUDENT IN AN ORGANIZED HEALTH CARE EDUCATION/TRAINING PROGRAM

## 2022-09-01 PROCEDURE — G8417 CALC BMI ABV UP PARAM F/U: HCPCS | Performed by: STUDENT IN AN ORGANIZED HEALTH CARE EDUCATION/TRAINING PROGRAM

## 2022-09-01 RX ORDER — FLUOXETINE HYDROCHLORIDE 20 MG/1
20 CAPSULE ORAL DAILY
Qty: 30 CAPSULE | Refills: 5 | Status: SHIPPED | OUTPATIENT
Start: 2022-09-01 | End: 2022-09-27

## 2022-09-01 ASSESSMENT — PATIENT HEALTH QUESTIONNAIRE - PHQ9
SUM OF ALL RESPONSES TO PHQ QUESTIONS 1-9: 20
SUM OF ALL RESPONSES TO PHQ9 QUESTIONS 1 & 2: 6
SUM OF ALL RESPONSES TO PHQ QUESTIONS 1-9: 20
8. MOVING OR SPEAKING SO SLOWLY THAT OTHER PEOPLE COULD HAVE NOTICED. OR THE OPPOSITE, BEING SO FIGETY OR RESTLESS THAT YOU HAVE BEEN MOVING AROUND A LOT MORE THAN USUAL: 0
4. FEELING TIRED OR HAVING LITTLE ENERGY: 3
SUM OF ALL RESPONSES TO PHQ QUESTIONS 1-9: 20
5. POOR APPETITE OR OVEREATING: 3
6. FEELING BAD ABOUT YOURSELF - OR THAT YOU ARE A FAILURE OR HAVE LET YOURSELF OR YOUR FAMILY DOWN: 2
2. FEELING DOWN, DEPRESSED OR HOPELESS: 3
10. IF YOU CHECKED OFF ANY PROBLEMS, HOW DIFFICULT HAVE THESE PROBLEMS MADE IT FOR YOU TO DO YOUR WORK, TAKE CARE OF THINGS AT HOME, OR GET ALONG WITH OTHER PEOPLE: 3
9. THOUGHTS THAT YOU WOULD BE BETTER OFF DEAD, OR OF HURTING YOURSELF: 0
3. TROUBLE FALLING OR STAYING ASLEEP: 3
SUM OF ALL RESPONSES TO PHQ QUESTIONS 1-9: 20
7. TROUBLE CONCENTRATING ON THINGS, SUCH AS READING THE NEWSPAPER OR WATCHING TELEVISION: 3
1. LITTLE INTEREST OR PLEASURE IN DOING THINGS: 3

## 2022-09-01 ASSESSMENT — COLUMBIA-SUICIDE SEVERITY RATING SCALE - C-SSRS
2. HAVE YOU ACTUALLY HAD ANY THOUGHTS OF KILLING YOURSELF?: NO
6. HAVE YOU EVER DONE ANYTHING, STARTED TO DO ANYTHING, OR PREPARED TO DO ANYTHING TO END YOUR LIFE?: NO
1. WITHIN THE PAST MONTH, HAVE YOU WISHED YOU WERE DEAD OR WISHED YOU COULD GO TO SLEEP AND NOT WAKE UP?: YES

## 2022-09-01 ASSESSMENT — ANXIETY QUESTIONNAIRES
1. FEELING NERVOUS, ANXIOUS, OR ON EDGE: 2
6. BECOMING EASILY ANNOYED OR IRRITABLE: 3
IF YOU CHECKED OFF ANY PROBLEMS ON THIS QUESTIONNAIRE, HOW DIFFICULT HAVE THESE PROBLEMS MADE IT FOR YOU TO DO YOUR WORK, TAKE CARE OF THINGS AT HOME, OR GET ALONG WITH OTHER PEOPLE: EXTREMELY DIFFICULT
GAD7 TOTAL SCORE: 20
5. BEING SO RESTLESS THAT IT IS HARD TO SIT STILL: 3
7. FEELING AFRAID AS IF SOMETHING AWFUL MIGHT HAPPEN: 3
3. WORRYING TOO MUCH ABOUT DIFFERENT THINGS: 3
4. TROUBLE RELAXING: 3
2. NOT BEING ABLE TO STOP OR CONTROL WORRYING: 3

## 2022-09-06 ENCOUNTER — TELEPHONE (OUTPATIENT)
Dept: PSYCHIATRY | Age: 37
End: 2022-09-06

## 2022-09-06 DIAGNOSIS — F40.10 SOCIAL ANXIETY DISORDER: ICD-10-CM

## 2022-09-06 RX ORDER — LORAZEPAM 1 MG/1
1 TABLET ORAL 2 TIMES DAILY PRN
Qty: 30 TABLET | Refills: 0 | OUTPATIENT
Start: 2022-09-06 | End: 2022-09-21

## 2022-09-06 NOTE — TELEPHONE ENCOUNTER
Medication:   Requested Prescriptions     Pending Prescriptions Disp Refills    LORazepam (ATIVAN) 1 MG tablet 30 tablet 0     Sig: Take 1 tablet by mouth 2 times daily as needed for Anxiety for up to 15 days. Patient Phone Number: [phone] (home)     Last appt: 9/1/2022   Next appt: 10/3/2022    Last OARRS: No flowsheet data found.

## 2022-09-07 RX ORDER — LORAZEPAM 1 MG/1
1 TABLET ORAL 2 TIMES DAILY PRN
Qty: 30 TABLET | Refills: 0 | Status: SHIPPED | OUTPATIENT
Start: 2022-09-07 | End: 2022-09-22 | Stop reason: SDUPTHER

## 2022-09-07 RX ORDER — LORAZEPAM 1 MG/1
1 TABLET ORAL 2 TIMES DAILY PRN
Qty: 30 TABLET | Refills: 0 | OUTPATIENT
Start: 2022-09-07 | End: 2022-09-22

## 2022-09-07 NOTE — TELEPHONE ENCOUNTER
Medication:   Requested Prescriptions     Pending Prescriptions Disp Refills    LORazepam (ATIVAN) 1 MG tablet [Pharmacy Med Name: LORAZEPAM 1 MG TABLET] 30 tablet 0     Sig: TAKE 1 TABLET BY MOUTH 2 TIMES DAILY AS NEEDED FOR ANXIETY FOR UP TO 15 DAYS. Patient Phone Number: [phone] (home)     Last appt: 9/1/2022   Next appt: 10/3/2022    Last OARRS: No flowsheet data found.

## 2022-09-08 NOTE — TELEPHONE ENCOUNTER
Patient is returning our call. Per Dr. Jose Angel Ty note: Tracee Soliman has been notified that his medication has been refilled.

## 2022-09-22 DIAGNOSIS — F40.10 SOCIAL ANXIETY DISORDER: ICD-10-CM

## 2022-09-22 RX ORDER — LORAZEPAM 1 MG/1
1 TABLET ORAL 2 TIMES DAILY PRN
Qty: 30 TABLET | Refills: 0 | Status: SHIPPED | OUTPATIENT
Start: 2022-09-22 | End: 2022-10-07 | Stop reason: SDUPTHER

## 2022-09-24 DIAGNOSIS — F34.1 DYSTHYMIA: ICD-10-CM

## 2022-09-27 RX ORDER — FLUOXETINE HYDROCHLORIDE 20 MG/1
CAPSULE ORAL
Qty: 30 CAPSULE | Refills: 5 | Status: SHIPPED | OUTPATIENT
Start: 2022-09-27 | End: 2022-11-03

## 2022-10-01 DIAGNOSIS — F34.1 DYSTHYMIA: ICD-10-CM

## 2022-10-01 DIAGNOSIS — F60.3 BORDERLINE PERSONALITY DISORDER (HCC): ICD-10-CM

## 2022-10-02 ASSESSMENT — ENCOUNTER SYMPTOMS
ALLERGIC/IMMUNOLOGIC NEGATIVE: 1
EYES NEGATIVE: 1
GASTROINTESTINAL NEGATIVE: 1
RESPIRATORY NEGATIVE: 1

## 2022-10-03 RX ORDER — RISPERIDONE 0.5 MG/1
0.5 TABLET, FILM COATED ORAL EVERY EVENING
Qty: 30 TABLET | Refills: 1 | Status: SHIPPED | OUTPATIENT
Start: 2022-10-03 | End: 2022-10-25

## 2022-10-03 NOTE — TELEPHONE ENCOUNTER
Medication:   Requested Prescriptions     Pending Prescriptions Disp Refills    risperiDONE (RISPERDAL) 0.5 MG tablet 30 tablet 1     Sig: Take 1 tablet by mouth every evening        Last Filled: 8/1/22      Patient Phone Number: [phone] (home)     Last appt: 9/1/2022   Next appt: 10/3/2022    Last OARRS: No flowsheet data found.

## 2022-10-07 DIAGNOSIS — F40.10 SOCIAL ANXIETY DISORDER: ICD-10-CM

## 2022-10-08 DIAGNOSIS — F34.1 DYSTHYMIA: ICD-10-CM

## 2022-10-10 NOTE — TELEPHONE ENCOUNTER
Medication:   Requested Prescriptions     Pending Prescriptions Disp Refills    traZODone (DESYREL) 50 MG tablet 30 tablet 2     Sig: Take 2 tablets by mouth nightly        Last Filled:  8/1/22    Patient Phone Number: [phone] (home)     Last appt: 9/1/2022   Next appt: 11/3/2022    Last OARRS: No flowsheet data found.

## 2022-10-10 NOTE — TELEPHONE ENCOUNTER
Medication:   Requested Prescriptions     Pending Prescriptions Disp Refills    LORazepam (ATIVAN) 1 MG tablet 30 tablet 0     Sig: Take 1 tablet by mouth 2 times daily as needed for Anxiety for up to 15 days. Last Filled:      Patient Phone Number: [phone] (home)     Last appt: 9/1/2022   Next appt: 10/8/2022    Last OARRS: No flowsheet data found.

## 2022-10-11 RX ORDER — LORAZEPAM 1 MG/1
1 TABLET ORAL 2 TIMES DAILY PRN
Qty: 30 TABLET | Refills: 0 | Status: SHIPPED | OUTPATIENT
Start: 2022-10-11 | End: 2022-10-26

## 2022-10-11 RX ORDER — TRAZODONE HYDROCHLORIDE 50 MG/1
100 TABLET ORAL NIGHTLY
Qty: 30 TABLET | Refills: 2 | Status: SHIPPED | OUTPATIENT
Start: 2022-10-11 | End: 2022-11-01 | Stop reason: SDUPTHER

## 2022-10-25 DIAGNOSIS — F60.3 BORDERLINE PERSONALITY DISORDER (HCC): ICD-10-CM

## 2022-10-25 DIAGNOSIS — F34.1 DYSTHYMIA: ICD-10-CM

## 2022-10-25 RX ORDER — RISPERIDONE 0.5 MG/1
TABLET ORAL
Qty: 30 TABLET | Refills: 1 | Status: SHIPPED | OUTPATIENT
Start: 2022-10-25 | End: 2022-11-27 | Stop reason: SDUPTHER

## 2022-10-25 NOTE — TELEPHONE ENCOUNTER
Medication:   Requested Prescriptions     Pending Prescriptions Disp Refills    risperiDONE (RISPERDAL) 0.5 MG tablet [Pharmacy Med Name: RISPERIDONE 0.5 MG TABLET] 30 tablet 1     Sig: TAKE 1 TABLET BY MOUTH EVERY DAY IN THE EVENING        Last Filled:  10/03/22    Patient Phone Number: [phone] (home)     Last appt: 9/1/2022   Next appt: 11/3/2022    Last OARRS: No flowsheet data found.

## 2022-10-27 DIAGNOSIS — F40.10 SOCIAL ANXIETY DISORDER: ICD-10-CM

## 2022-10-27 RX ORDER — LORAZEPAM 1 MG/1
1 TABLET ORAL 2 TIMES DAILY PRN
Qty: 30 TABLET | Refills: 0 | OUTPATIENT
Start: 2022-10-27 | End: 2022-11-11

## 2022-10-28 NOTE — PROGRESS NOTES
PSYCHIATRY PROGRESS NOTE    Eleuterio Newsome  1985  11/3/22  Face to Face time: 30 minutes  PCP: Neo Barry DO    CC:   Chief Complaint   Patient presents with    Follow-up     Patient is a 26-year-old male with a past medical history significant for hepatitis C, hyperlipidemia, and chronic kidney disease presents the outpatient psychiatric clinic today for evaluation and management of depression and anxiety. A:  Patient's presentation today is indicative of continued difficulties with some ADHD symptoms that may be contributing negatively to the patient's depression and anxiety. We will continue to provide ongoing support. Diagnosis:  Dysthymia  Borderline Personality Disorder  Social anxiety disorder  Childhood ADHD by history    P:   We will plan to continue the patient's current medications of lorazepam 1 mg twice daily as needed for anxiety, risperidone 0.5 mg nightly, trazodone 100 mg nightly, Depakote 1000 mg twice daily. We will plan to add atomoxetine 40 mg daily for treatment of historical ADHD as well as current ADHD symptoms. Patient was cautioned regarding adverse effects of the medications including headaches, abdominal pain, nausea, appetite suppression, xerostomia, insomnia, and drowsiness. We will plan to discontinue fluoxetine at the patient's medication list secondary to adverse effects    Medication Monitoring:    - PDMP reviewed: Current cannabinoids. Lorazepam 1mg bid 15-day supply filled 10/27/2022. Follow-up: 4 weeks    Safety: Pt was counseled on the potential for increased suicidal ideations and advised on potential options for dealing with these including hotlines, calling the office, or going to the nearest emergency room. __________________________________________________________________________    S:   Patient identified that he did not tolerate the fluoxetine that has been started last time.   He noted that he did end up getting significantly paranoid on the medication and stopped it shortly after experiencing less. Patient noted that his mind is still having episodes of racing, noting that this tends to come about particularly at his job. Patient noted that this causes him to feel overly impulsive at times, get more distracted, and ultimately to get an increase in anger when he feels that he cannot keep up with his peers. Patient identified that his stress level is up with work, however he does not believe that this is the ultimate explanation for why he is having as many difficulties as he is. Patient did identify that he has not been eating much lately. Patient denied any SI/HI/AVH on evaluation today. ROS:  Review of Systems   Constitutional:  Positive for appetite change. HENT: Negative. Eyes: Negative. Respiratory: Negative. Cardiovascular: Negative. Gastrointestinal: Negative. Endocrine: Negative. Genitourinary: Negative. Musculoskeletal: Negative. Skin: Negative. Allergic/Immunologic: Negative. Neurological: Negative. Hematological: Negative. Psychiatric/Behavioral:  Positive for decreased concentration and dysphoric mood. The patient is nervous/anxious. Brief Medical Hx:   Patient Active Problem List   Diagnosis    Patient overweight    Hepatitis C virus infection without hepatic coma    Mixed hyperlipidemia    Chronic kidney disease        Brief Psych Hx:  Hosp: 1x in 5/2021 when he was in the hospital 9 days for SI  Diagnoses: Childhood ADHD, anxiety, bipolar disorder, depression, schizophrenia per charting  Med trials: Adderall (recreationally trialed, was helpful), Bupropion (didn't help), Fluoxetine (paranoia), Quetiapine, sertraline (paced for 14h)  Outpt: Previously has engaged with psychiatrists and counselors. Last psychiatrist he was seeing he was fired from for missing appointments  NSSI: Cutting behaviors as recent as 2020. The patient does have significant scarring from these.   Suicide Attempts: Identified 6 definable times when he attempted suicide in the past, though has had thoughts since he was 12-13. O:  Wt Readings from Last 3 Encounters:   11/03/22 170 lb (77.1 kg)   08/01/22 184 lb 6.4 oz (83.6 kg)   07/11/22 182 lb 10.1 oz (82.8 kg)       Vitals:    11/03/22 1604   BP: 118/82   Pulse: (!) 102   SpO2: 96%   Weight: 170 lb (77.1 kg)       Mental Status Exam:   Appearance:    Appropriately dressed  Motor: No abnormal movements, tics or mannerisms. Eye Contact: Good  Speech:    Appropriate rate and rhythm  Language:   Appropriate diction  Mood/Affect:  \" Not great\"/some blunting apparent  Thought Process:   Linear, logical  Thought Content:    Some depressive and anxious content present, no SI/HI  Hallucinations:   Denied, not seem to be responding to internal stimuli  Associations:   Intact  Attention/Concentration:   Intact  Orientation:    Alert and oriented x4  Memory:   Intact  Fund of Knowledge:    Appropriate for age and education  Insight/Judgement:   Intact/intact      BRANDON-7 SCREENING 11/3/2022 9/1/2022   Feeling nervous, anxious, or on edge Nearly every day More than half the days   Not being able to stop or control worrying Nearly every day Nearly every day   Worrying too much about different things Nearly every day Nearly every day   Trouble relaxing More than half the days Nearly every day   Being so restless that it is hard to sit still More than half the days Nearly every day   Becoming easily annoyed or irritable Nearly every day Nearly every day   Feeling afraid as if something awful might happen More than half the days Nearly every day   BRANDON-7 Total Score 18 20   How difficult have these problems made it for you to do your work, take care of things at home, or get along with other people?  Very difficult Extremely difficult     PHQ-9 Questionaire 11/3/2022 9/1/2022   Little interest or pleasure in doing things 2 3   Feeling down, depressed, or hopeless 1 3   Trouble falling or staying asleep, or sleeping too much 3 3   Feeling tired or having little energy 1 3   Poor appetite or overeating 2 3   Feeling bad about yourself - or that you are a failure or have let yourself or your family down 2 2   Trouble concentrating on things, such as reading the newspaper or watching television 3 3   Moving or speaking so slowly that other people could have noticed. Or the opposite - being so fidgety or restless that you have been moving around a lot more than usual 3 0   Thoughts that you would be better off dead, or of hurting yourself in some way 1 0   PHQ-9 Total Score 18 20   If you checked off any problems, how difficult have these problems made it for you to do your work, take care of things at home, or get along with other people?  3 3        Labs:     Orders Only on 07/28/2022   Component Date Value Ref Range Status    Cholesterol, Total 07/28/2022 142  0 - 199 mg/dL Final    Triglycerides 07/28/2022 101  0 - 150 mg/dL Final    HDL 07/28/2022 42  40 - 60 mg/dL Final    LDL Calculated 07/28/2022 80  <100 mg/dL Final    VLDL Cholesterol Calculated 07/28/2022 20  Not Established mg/dL Final    Valproic Acid, Free 07/28/2022 5.7 (A)  7.0 - 23.0 ug/mL Final    Valproic Acid Lvl 07/28/2022 59  50 - 125 ug/mL Corrected    [facility] [address] [address] [phone]    Valproic Dose amount 07/28/2022 UNK   Final    Valproic Date last dose 07/28/2022 UNK   Final    Valproic Time last dose 07/28/2022 UNK   Final    Valproic Acid % Free 07/28/2022 9.7  5.0 - 18.4 % Final    [facility] [address] [facility] [address] [phone]    Sodium 07/28/2022 141  136 - 145 mmol/L Final    Potassium 07/28/2022 4.3  3.5 - 5.1 mmol/L Final    Chloride 07/28/2022 105  99 - 110 mmol/L Final    CO2 07/28/2022 25  21 - 32 mmol/L Final    Anion Gap 07/28/2022 11  3 - 16 Final    Glucose 07/28/2022 105 (A)  70 - 99 mg/dL Final    BUN 07/28/2022 15  7 - 20 mg/dL Final    Creatinine 07/28/2022 1.1  0.9 - 1.3 mg/dL Final    GFR Non- 07/28/2022 >60  >60 Final    Comment: >60 mL/min/1.73m2 EGFR, calc. for ages 25 and older using the  MDRD formula (not corrected for weight), is valid for stable  renal function. GFR  07/28/2022 >60  >60 Final    Comment: Chronic Kidney Disease: less than 60 ml/min/1.73 sq.m. Kidney Failure: less than 15 ml/min/1.73 sq.m. Results valid for patients 18 years and older.       Calcium 07/28/2022 8.6  8.3 - 10.6 mg/dL Final    Total Protein 07/28/2022 6.4  6.4 - 8.2 g/dL Final    Albumin 07/28/2022 4.0  3.4 - 5.0 g/dL Final    Albumin/Globulin Ratio 07/28/2022 1.7  1.1 - 2.2 Final    Total Bilirubin 07/28/2022 0.4  0.0 - 1.0 mg/dL Final    Alkaline Phosphatase 07/28/2022 44  40 - 129 U/L Final    ALT 07/28/2022 27  10 - 40 U/L Final    AST 07/28/2022 22  15 - 37 U/L Final       EKG: None on file        Taylor Grover MD  Psychiatrist

## 2022-11-01 DIAGNOSIS — F60.3 BORDERLINE PERSONALITY DISORDER (HCC): ICD-10-CM

## 2022-11-01 DIAGNOSIS — F34.1 DYSTHYMIA: ICD-10-CM

## 2022-11-02 DIAGNOSIS — F60.3 BORDERLINE PERSONALITY DISORDER (HCC): ICD-10-CM

## 2022-11-02 DIAGNOSIS — F34.1 DYSTHYMIA: ICD-10-CM

## 2022-11-02 NOTE — TELEPHONE ENCOUNTER
Medication:   Requested Prescriptions     Pending Prescriptions Disp Refills    traZODone (DESYREL) 50 MG tablet 30 tablet 2     Sig: Take 2 tablets by mouth nightly        Last Filled:      Patient Phone Number: [phone] (home)     Last appt: 9/1/2022   Next appt: 11/1/2022    Last OARRS: No flowsheet data found.

## 2022-11-02 NOTE — TELEPHONE ENCOUNTER
Medication:   Requested Prescriptions     Pending Prescriptions Disp Refills    divalproex (DEPAKOTE) 500 MG DR tablet 120 tablet 2     Sig: Take 2 tablets by mouth in the morning and at bedtime        Last Filled:      Patient Phone Number: [phone] (home)     Last appt: 9/1/2022   Next appt: 11/2/2022    Last OARRS: No flowsheet data found.

## 2022-11-02 NOTE — TELEPHONE ENCOUNTER
Medication:   Requested Prescriptions     Pending Prescriptions Disp Refills    risperiDONE (RISPERDAL) 0.5 MG tablet 30 tablet 1        Last Filled:      Patient Phone Number: [phone] (home)     Last appt: 9/1/2022   Next appt: 11/1/2022    Last OARRS: No flowsheet data found.

## 2022-11-03 ENCOUNTER — OFFICE VISIT (OUTPATIENT)
Dept: PSYCHIATRY | Age: 37
End: 2022-11-03
Payer: COMMERCIAL

## 2022-11-03 VITALS
BODY MASS INDEX: 25.85 KG/M2 | OXYGEN SATURATION: 96 % | WEIGHT: 170 LBS | SYSTOLIC BLOOD PRESSURE: 118 MMHG | HEART RATE: 102 BPM | DIASTOLIC BLOOD PRESSURE: 82 MMHG

## 2022-11-03 DIAGNOSIS — F34.1 DYSTHYMIA: ICD-10-CM

## 2022-11-03 DIAGNOSIS — F60.3 BORDERLINE PERSONALITY DISORDER (HCC): ICD-10-CM

## 2022-11-03 DIAGNOSIS — F90.0 ADHD, PREDOMINANTLY INATTENTIVE TYPE: Primary | ICD-10-CM

## 2022-11-03 PROCEDURE — 1036F TOBACCO NON-USER: CPT | Performed by: STUDENT IN AN ORGANIZED HEALTH CARE EDUCATION/TRAINING PROGRAM

## 2022-11-03 PROCEDURE — G8417 CALC BMI ABV UP PARAM F/U: HCPCS | Performed by: STUDENT IN AN ORGANIZED HEALTH CARE EDUCATION/TRAINING PROGRAM

## 2022-11-03 PROCEDURE — G8427 DOCREV CUR MEDS BY ELIG CLIN: HCPCS | Performed by: STUDENT IN AN ORGANIZED HEALTH CARE EDUCATION/TRAINING PROGRAM

## 2022-11-03 PROCEDURE — 99214 OFFICE O/P EST MOD 30 MIN: CPT | Performed by: STUDENT IN AN ORGANIZED HEALTH CARE EDUCATION/TRAINING PROGRAM

## 2022-11-03 PROCEDURE — G8484 FLU IMMUNIZE NO ADMIN: HCPCS | Performed by: STUDENT IN AN ORGANIZED HEALTH CARE EDUCATION/TRAINING PROGRAM

## 2022-11-03 RX ORDER — DIVALPROEX SODIUM 500 MG/1
1000 TABLET, DELAYED RELEASE ORAL 2 TIMES DAILY
Qty: 120 TABLET | Refills: 2 | Status: SHIPPED | OUTPATIENT
Start: 2022-11-03 | End: 2022-12-22 | Stop reason: SDUPTHER

## 2022-11-03 RX ORDER — ATOMOXETINE 40 MG/1
40 CAPSULE ORAL DAILY
Qty: 14 CAPSULE | Refills: 0 | Status: SHIPPED | OUTPATIENT
Start: 2022-11-03 | End: 2022-11-17 | Stop reason: SINTOL

## 2022-11-03 ASSESSMENT — PATIENT HEALTH QUESTIONNAIRE - PHQ9
SUM OF ALL RESPONSES TO PHQ QUESTIONS 1-9: 18
10. IF YOU CHECKED OFF ANY PROBLEMS, HOW DIFFICULT HAVE THESE PROBLEMS MADE IT FOR YOU TO DO YOUR WORK, TAKE CARE OF THINGS AT HOME, OR GET ALONG WITH OTHER PEOPLE: 3
7. TROUBLE CONCENTRATING ON THINGS, SUCH AS READING THE NEWSPAPER OR WATCHING TELEVISION: 3
9. THOUGHTS THAT YOU WOULD BE BETTER OFF DEAD, OR OF HURTING YOURSELF: 1
1. LITTLE INTEREST OR PLEASURE IN DOING THINGS: 2
SUM OF ALL RESPONSES TO PHQ QUESTIONS 1-9: 18
3. TROUBLE FALLING OR STAYING ASLEEP: 3
SUM OF ALL RESPONSES TO PHQ9 QUESTIONS 1 & 2: 3
5. POOR APPETITE OR OVEREATING: 2
8. MOVING OR SPEAKING SO SLOWLY THAT OTHER PEOPLE COULD HAVE NOTICED. OR THE OPPOSITE, BEING SO FIGETY OR RESTLESS THAT YOU HAVE BEEN MOVING AROUND A LOT MORE THAN USUAL: 3
4. FEELING TIRED OR HAVING LITTLE ENERGY: 1
SUM OF ALL RESPONSES TO PHQ QUESTIONS 1-9: 18
6. FEELING BAD ABOUT YOURSELF - OR THAT YOU ARE A FAILURE OR HAVE LET YOURSELF OR YOUR FAMILY DOWN: 2
SUM OF ALL RESPONSES TO PHQ QUESTIONS 1-9: 17
2. FEELING DOWN, DEPRESSED OR HOPELESS: 1

## 2022-11-03 ASSESSMENT — ANXIETY QUESTIONNAIRES
2. NOT BEING ABLE TO STOP OR CONTROL WORRYING: 3
4. TROUBLE RELAXING: 2
5. BEING SO RESTLESS THAT IT IS HARD TO SIT STILL: 2
IF YOU CHECKED OFF ANY PROBLEMS ON THIS QUESTIONNAIRE, HOW DIFFICULT HAVE THESE PROBLEMS MADE IT FOR YOU TO DO YOUR WORK, TAKE CARE OF THINGS AT HOME, OR GET ALONG WITH OTHER PEOPLE: VERY DIFFICULT
3. WORRYING TOO MUCH ABOUT DIFFERENT THINGS: 3
GAD7 TOTAL SCORE: 18
1. FEELING NERVOUS, ANXIOUS, OR ON EDGE: 3
7. FEELING AFRAID AS IF SOMETHING AWFUL MIGHT HAPPEN: 2
6. BECOMING EASILY ANNOYED OR IRRITABLE: 3

## 2022-11-04 RX ORDER — TRAZODONE HYDROCHLORIDE 50 MG/1
100 TABLET ORAL NIGHTLY
Qty: 30 TABLET | Refills: 2 | Status: SHIPPED | OUTPATIENT
Start: 2022-11-04 | End: 2022-11-27 | Stop reason: SDUPTHER

## 2022-11-04 RX ORDER — RISPERIDONE 0.5 MG/1
TABLET ORAL
Qty: 30 TABLET | Refills: 1 | OUTPATIENT
Start: 2022-11-04

## 2022-11-04 RX ORDER — DIVALPROEX SODIUM 500 MG/1
1000 TABLET, DELAYED RELEASE ORAL 2 TIMES DAILY
Qty: 360 TABLET | OUTPATIENT
Start: 2022-11-04

## 2022-11-04 RX ORDER — DIVALPROEX SODIUM 500 MG/1
1000 TABLET, DELAYED RELEASE ORAL 2 TIMES DAILY
Qty: 120 TABLET | Refills: 2 | OUTPATIENT
Start: 2022-11-04

## 2022-11-10 DIAGNOSIS — F34.1 DYSTHYMIA: ICD-10-CM

## 2022-11-11 DIAGNOSIS — F34.1 DYSTHYMIA: ICD-10-CM

## 2022-11-11 RX ORDER — LORAZEPAM 1 MG/1
1 TABLET ORAL 2 TIMES DAILY PRN
Qty: 30 TABLET | Refills: 0 | Status: SHIPPED | OUTPATIENT
Start: 2022-11-11 | End: 2022-11-26

## 2022-11-11 RX ORDER — TRAZODONE HYDROCHLORIDE 50 MG/1
TABLET ORAL
Qty: 30 TABLET | Refills: 2 | OUTPATIENT
Start: 2022-11-11

## 2022-11-26 DIAGNOSIS — F60.3 BORDERLINE PERSONALITY DISORDER (HCC): ICD-10-CM

## 2022-11-26 DIAGNOSIS — F34.1 DYSTHYMIA: ICD-10-CM

## 2022-11-27 DIAGNOSIS — F34.1 DYSTHYMIA: ICD-10-CM

## 2022-11-27 DIAGNOSIS — F60.3 BORDERLINE PERSONALITY DISORDER (HCC): ICD-10-CM

## 2022-11-28 RX ORDER — TRAZODONE HYDROCHLORIDE 100 MG/1
100 TABLET ORAL NIGHTLY
Qty: 30 TABLET | Refills: 2 | Status: SHIPPED | OUTPATIENT
Start: 2022-11-28 | End: 2022-12-22 | Stop reason: SDUPTHER

## 2022-11-28 RX ORDER — RISPERIDONE 0.5 MG/1
TABLET ORAL
Qty: 30 TABLET | Refills: 1 | OUTPATIENT
Start: 2022-11-28

## 2022-11-28 RX ORDER — RISPERIDONE 0.5 MG/1
0.5 TABLET ORAL NIGHTLY
Qty: 30 TABLET | Refills: 1 | Status: SHIPPED | OUTPATIENT
Start: 2022-11-28 | End: 2022-12-22 | Stop reason: SDUPTHER

## 2022-12-13 DIAGNOSIS — F40.10 SOCIAL ANXIETY DISORDER: ICD-10-CM

## 2022-12-13 RX ORDER — LORAZEPAM 1 MG/1
1 TABLET ORAL 2 TIMES DAILY PRN
Qty: 30 TABLET | Refills: 0 | Status: SHIPPED | OUTPATIENT
Start: 2022-12-13 | End: 2023-01-12

## 2022-12-13 NOTE — TELEPHONE ENCOUNTER
Medication:   Requested Prescriptions     Pending Prescriptions Disp Refills    LORazepam (ATIVAN) 1 MG tablet 30 tablet 1     Sig: Take 1 tablet by mouth 2 times daily as needed for Anxiety for up to 30 days. Last Filled: 11/28/22     Patient Phone Number: [phone] (home)     Last appt: 11/3/2022   Next appt: 12/22/2022    Last OARRS: No flowsheet data found.

## 2022-12-19 ASSESSMENT — ENCOUNTER SYMPTOMS
EYES NEGATIVE: 1
ALLERGIC/IMMUNOLOGIC NEGATIVE: 1
RESPIRATORY NEGATIVE: 1
GASTROINTESTINAL NEGATIVE: 1

## 2022-12-20 DIAGNOSIS — F34.1 DYSTHYMIA: ICD-10-CM

## 2022-12-20 DIAGNOSIS — F60.3 BORDERLINE PERSONALITY DISORDER (HCC): ICD-10-CM

## 2022-12-20 RX ORDER — TRAZODONE HYDROCHLORIDE 100 MG/1
100 TABLET ORAL NIGHTLY
Qty: 30 TABLET | Refills: 2 | OUTPATIENT
Start: 2022-12-20

## 2022-12-21 NOTE — PROGRESS NOTES
PSYCHIATRY PROGRESS NOTE    Yadi Moe  1985  12/22/22  Face to Face time: 30 minutes  PCP: Shavonne Mehta DO    CC:   Chief Complaint   Patient presents with    Follow-up       Patient is a 80-year-old male with a past medical history significant for hepatitis C, hyperlipidemia, and chronic kidney disease presents the outpatient psychiatric clinic today for evaluation and management of depression and anxiety. A:  Patient's presentation today is indicative of relative stability to his underlying mental health issues with the current medication regimen. We will continue to follow and provide support. Diagnosis:  Dysthymia  Borderline Personality Disorder  Social anxiety disorder  Childhood ADHD by history    P:   We will plan to continue the patient's current medications of lorazepam 1 mg twice daily, risperidone 0.5 mg nightly, trazodone 100 mg nightly, Adderall IR 10 mg twice daily, and Depakote 1000 mg nightly. We will plan to order surveillance labs including CBC, CMP, valproic acid level, and a drug screen    Medication Monitoring:    - PDMP reviewed: Current cannabinoids. Adderall IR 10mg bid 30-day supply filled 2022. Lorazepam 1mg bid 15-day supply filled 2022. Follow-up: 4 weeks    Safety: Pt was counseled on the potential for increased suicidal ideations and advised on potential options for dealing with these including hotlines, calling the office, or going to the nearest emergency room. __________________________________________________________________________    S:   Patient indicated that he is generally doing well at this time. 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 He notes that the Adderall has really been helping him, finds that he calms down and does not get worked up as much.   This medication also allows him to feel like he can \"let things roll off of me a little bit easier\". He notes that he gets about 4 hours per dose and is fine with this. Patient identified that his mood is generally better, not feeling as depressed. He is going through some new stressors with his children and his ex, with the ex saying things about him to his daughter that he does not have the ability to control. The [place] holidays tend to remind him how the relationship with his daughter remains in perfect. He is making a conscious effort to try to work through this and does recognize that things will hopefully get better over the course of time. Patient denied any SI/HI/AVH on evaluation today. ROS:  Review of Systems   Constitutional: Negative. HENT: Negative. Eyes: Negative. Respiratory: Negative. Cardiovascular: Negative. Gastrointestinal: Negative. Endocrine: Negative. Genitourinary: Negative. Musculoskeletal: Negative. Skin: Negative. Allergic/Immunologic: Negative. Neurological: Negative. Hematological: Negative. Psychiatric/Behavioral: Negative. Brief Medical Hx:   Patient Active Problem List   Diagnosis    Patient overweight    Hepatitis C virus infection without hepatic coma    Mixed hyperlipidemia    Chronic kidney disease        Brief Psych Hx:  Hosp: 1x in 5/2021 when he was in the hospital 9 days for SI  Diagnoses: Childhood ADHD, anxiety, bipolar disorder, depression, schizophrenia per charting  Med trials: Adderall (recreationally trialed, was helpful), Bupropion (didn't help), Fluoxetine (paranoia), Quetiapine, sertraline (paced for 14h)  Outpt: Previously has engaged with psychiatrists and counselors. Last psychiatrist he was seeing he was fired from for missing appointments  NSSI: Cutting behaviors as recent as 2020. The patient does have significant scarring from these.   Suicide Attempts: Identified 6 definable times when he attempted suicide in the past, though has had thoughts since he was 12-13.    O:  Wt Readings from Last 3 Encounters:   11/03/22 170 lb (77.1 kg)   08/01/22 184 lb 6.4 oz (83.6 kg)   07/11/22 182 lb 10.1 oz (82.8 kg)       There were no vitals filed for this visit. This is secondary to being a virtual visit. Mental Status Exam:   Appearance:    Appropriately dressed  Motor: No abnormal movements, tics or mannerisms. Eye Contact: Good for video  Speech:    Appropriate rate and rhythm  Language:   Appropriate diction  Mood/Affect:  \" Pretty good\"/full range of affect seen  Thought Process:   Linear, logical  Thought Content:    Topic-appropriate, no SI/HI  Hallucinations:   Denied, not seem to be responding to internal stimuli  Associations:   Intact  Attention/Concentration:   Intact  Orientation:    Alert and oriented x4  Memory:   Intact  Fund of Knowledge:    Appropriate for age and education  Insight/Judgement:   Intact/intact      PHQ-9 Questionaire 11/3/2022 9/1/2022   Little interest or pleasure in doing things 2 3   Feeling down, depressed, or hopeless 1 3   Trouble falling or staying asleep, or sleeping too much 3 3   Feeling tired or having little energy 1 3   Poor appetite or overeating 2 3   Feeling bad about yourself - or that you are a failure or have let yourself or your family down 2 2   Trouble concentrating on things, such as reading the newspaper or watching television 3 3   Moving or speaking so slowly that other people could have noticed. Or the opposite - being so fidgety or restless that you have been moving around a lot more than usual 3 0   Thoughts that you would be better off dead, or of hurting yourself in some way 1 0   PHQ-9 Total Score 18 20   If you checked off any problems, how difficult have these problems made it for you to do your work, take care of things at home, or get along with other people?  3 3     BRANDON-7 SCREENING 11/3/2022 9/1/2022   Feeling nervous, anxious, or on edge Nearly every day More than half the days   Not being able to stop or control worrying Nearly every day Nearly every day   Worrying too much about different things Nearly every day Nearly every day   Trouble relaxing More than half the days Nearly every day   Being so restless that it is hard to sit still More than half the days Nearly every day   Becoming easily annoyed or irritable Nearly every day Nearly every day   Feeling afraid as if something awful might happen More than half the days Nearly every day   BRANDON-7 Total Score 18 20   How difficult have these problems made it for you to do your work, take care of things at home, or get along with other people? Very difficult Extremely difficult      Labs:     Orders Only on 07/28/2022   Component Date Value Ref Range Status    Cholesterol, Total 07/28/2022 142  0 - 199 mg/dL Final    Triglycerides 07/28/2022 101  0 - 150 mg/dL Final    HDL 07/28/2022 42  40 - 60 mg/dL Final    LDL Calculated 07/28/2022 80  <100 mg/dL Final    VLDL Cholesterol Calculated 07/28/2022 20  Not Established mg/dL Final    Valproic Acid, Free 07/28/2022 5.7 (A)  7.0 - 23.0 ug/mL Final    Valproic Acid Lvl 07/28/2022 59  50 - 125 ug/mL Corrected    [address] [phone]    Valproic Dose amount 07/28/2022 UNK   Final    Valproic Date last dose 07/28/2022 UNK   Final    Valproic Time last dose 07/28/2022 UNK   Final    Valproic Acid % Free 07/28/2022 9.7  5.0 - 18.4 % Final    [facility] [address] [place], [place] [address] [phone]    Sodium 07/28/2022 141  136 - 145 mmol/L Final    Potassium 07/28/2022 4.3  3.5 - 5.1 mmol/L Final    Chloride 07/28/2022 105  99 - 110 mmol/L Final    CO2 07/28/2022 25  21 - 32 mmol/L Final    Anion Gap 07/28/2022 11  3 - 16 Final    Glucose 07/28/2022 105 (A)  70 - 99 mg/dL Final    BUN 07/28/2022 15  7 - 20 mg/dL Final    Creatinine 07/28/2022 1.1  0.9 - 1.3 mg/dL Final    GFR Non- 07/28/2022 >60  >60 Final    Comment: >60 mL/min/1.73m2 EGFR, calc.  for ages 25 and older using the  MDRD formula (not corrected for weight), is valid for stable  renal function. GFR  07/28/2022 >60  >60 Final    Comment: Chronic Kidney Disease: less than 60 ml/min/1.73 sq.m. Kidney Failure: less than 15 ml/min/1.73 sq.m. Results valid for patients 18 years and older.       Calcium 07/28/2022 8.6  8.3 - 10.6 mg/dL Final    Total Protein 07/28/2022 6.4  6.4 - 8.2 g/dL Final    Albumin 07/28/2022 4.0  3.4 - 5.0 g/dL Final    Albumin/Globulin Ratio 07/28/2022 1.7  1.1 - 2.2 Final    Total Bilirubin 07/28/2022 0.4  0.0 - 1.0 mg/dL Final    Alkaline Phosphatase 07/28/2022 44  40 - 129 U/L Final    ALT 07/28/2022 27  10 - 40 U/L Final    AST 07/28/2022 22  15 - 37 U/L Final       EKG: None on file        Stanley Amin MD  Psychiatrist

## 2022-12-22 ENCOUNTER — TELEMEDICINE (OUTPATIENT)
Dept: PSYCHIATRY | Age: 37
End: 2022-12-22
Payer: COMMERCIAL

## 2022-12-22 DIAGNOSIS — F34.1 DYSTHYMIA: ICD-10-CM

## 2022-12-22 DIAGNOSIS — F60.3 BORDERLINE PERSONALITY DISORDER (HCC): ICD-10-CM

## 2022-12-22 DIAGNOSIS — F90.0 ADHD, PREDOMINANTLY INATTENTIVE TYPE: ICD-10-CM

## 2022-12-22 PROCEDURE — 1036F TOBACCO NON-USER: CPT | Performed by: STUDENT IN AN ORGANIZED HEALTH CARE EDUCATION/TRAINING PROGRAM

## 2022-12-22 PROCEDURE — G8484 FLU IMMUNIZE NO ADMIN: HCPCS | Performed by: STUDENT IN AN ORGANIZED HEALTH CARE EDUCATION/TRAINING PROGRAM

## 2022-12-22 PROCEDURE — G8428 CUR MEDS NOT DOCUMENT: HCPCS | Performed by: STUDENT IN AN ORGANIZED HEALTH CARE EDUCATION/TRAINING PROGRAM

## 2022-12-22 PROCEDURE — G8417 CALC BMI ABV UP PARAM F/U: HCPCS | Performed by: STUDENT IN AN ORGANIZED HEALTH CARE EDUCATION/TRAINING PROGRAM

## 2022-12-22 PROCEDURE — 99214 OFFICE O/P EST MOD 30 MIN: CPT | Performed by: STUDENT IN AN ORGANIZED HEALTH CARE EDUCATION/TRAINING PROGRAM

## 2022-12-22 RX ORDER — RISPERIDONE 0.5 MG/1
0.5 TABLET ORAL NIGHTLY
Qty: 30 TABLET | Refills: 2 | Status: SHIPPED | OUTPATIENT
Start: 2022-12-22

## 2022-12-22 RX ORDER — TRAZODONE HYDROCHLORIDE 100 MG/1
100 TABLET ORAL NIGHTLY
Qty: 30 TABLET | Refills: 2 | Status: SHIPPED | OUTPATIENT
Start: 2022-12-22

## 2022-12-22 RX ORDER — DIVALPROEX SODIUM 500 MG/1
1000 TABLET, DELAYED RELEASE ORAL 2 TIMES DAILY
Qty: 120 TABLET | Refills: 2 | Status: SHIPPED | OUTPATIENT
Start: 2022-12-22

## 2022-12-22 RX ORDER — DEXTROAMPHETAMINE SACCHARATE, AMPHETAMINE ASPARTATE, DEXTROAMPHETAMINE SULFATE AND AMPHETAMINE SULFATE 2.5; 2.5; 2.5; 2.5 MG/1; MG/1; MG/1; MG/1
10 TABLET ORAL 2 TIMES DAILY
Qty: 60 TABLET | Refills: 0 | Status: SHIPPED | OUTPATIENT
Start: 2022-12-26 | End: 2023-01-25

## 2022-12-23 RX ORDER — RISPERIDONE 0.5 MG/1
0.5 TABLET ORAL NIGHTLY
Qty: 30 TABLET | Refills: 1 | OUTPATIENT
Start: 2022-12-23

## 2022-12-28 DIAGNOSIS — F40.10 SOCIAL ANXIETY DISORDER: ICD-10-CM

## 2022-12-28 RX ORDER — LORAZEPAM 1 MG/1
1 TABLET ORAL 2 TIMES DAILY PRN
Qty: 30 TABLET | Refills: 0 | OUTPATIENT
Start: 2022-12-28 | End: 2023-01-27

## 2023-01-15 DIAGNOSIS — F40.10 SOCIAL ANXIETY DISORDER: ICD-10-CM

## 2023-01-16 RX ORDER — LORAZEPAM 1 MG/1
1 TABLET ORAL 2 TIMES DAILY PRN
Qty: 60 TABLET | Refills: 0 | Status: SHIPPED | OUTPATIENT
Start: 2023-01-16 | End: 2023-02-15 | Stop reason: SDUPTHER

## 2023-01-25 DIAGNOSIS — F90.0 ADHD, PREDOMINANTLY INATTENTIVE TYPE: ICD-10-CM

## 2023-01-25 RX ORDER — DEXTROAMPHETAMINE SACCHARATE, AMPHETAMINE ASPARTATE, DEXTROAMPHETAMINE SULFATE AND AMPHETAMINE SULFATE 2.5; 2.5; 2.5; 2.5 MG/1; MG/1; MG/1; MG/1
10 TABLET ORAL 2 TIMES DAILY
Qty: 60 TABLET | Refills: 0 | Status: SHIPPED | OUTPATIENT
Start: 2023-01-25 | End: 2023-02-25 | Stop reason: SDUPTHER

## 2023-01-30 NOTE — PROGRESS NOTES
PSYCHIATRY PROGRESS NOTE    Manya Goldberg  1985  02/02/23  Face to Face time: 30 minutes  PCP: Stephanie Louie DO    CC:   Chief Complaint   Patient presents with    Follow-up     Patient is a 40-year-old male with a past medical history significant for hepatitis C, hyperlipidemia, and chronic kidney disease presents the outpatient psychiatric clinic today for evaluation and management of depression and anxiety. Manya Goldberg, was evaluated through a synchronous (real-time) audio-video encounter. The patient (or guardian if applicable) is aware that this is a billable service, which includes applicable co-pays. This Virtual Visit was conducted with patient's (and/or legal guardian's) consent. The visit was conducted pursuant to the emergency declaration under the [facility], [address] [place] [address] authority and the [employer] General Act. Patient identification was verified, and a caregiver was present when appropriate. The patient was located at Home: [address]  Provider was located at [facility] (Appt Dept): [address] [facility],  [address] [facility]         A:  Patient's presentation today remains indicative of relative stability of his underlying psychiatric diagnoses with the current medications. No changes were recommended from today's evaluation. We will continue to follow and provide ongoing support. Diagnosis:  Dysthymia  Borderline Personality Disorder  Social anxiety disorder  Childhood ADHD by history    P:   No changes to medications. Patient to continue Adderall IR 10mg bid, lorazepam 1mg bid, risperidone 0.5mg qhs, trazodone 100mg qhs, and depakote 1000mg bid. Medication Monitoring:    - PDMP reviewed: Adderall IR 10 mg twice daily 30-day supply last filled 1/25/2023. Current prescriptions for cannabinoids.   Lorazepam 1 mg twice daily 30-day supply last filled 1/16/2023. Follow-up: 4 weeks    Safety: Pt was counseled on the potential for increased suicidal ideations and advised on potential options for dealing with these including hotlines, calling the office, or going to the nearest emergency room. __________________________________________________________________________    S:   Patient identified that he's doing well from a personal standpoint, however he struggles with interpersonal issues significantly. He notes that the biggest one right now is an issue with his daughter who is no longer talking to him. He believes that his ex has played a negative role in influencing his daughter into believing things that are not necessarily true. He stated that the distance between himself and his daughter weighs on him regularly, finds that this does play an influential role in how irritable he might get at work as well. Through all of this, some difficulties with sleep at times, can see that his trazodone fails him some nights but not all. The patient denied any SI/HI/AVH on evaluation. ROS:  Review of Systems   Constitutional: Negative. HENT: Negative. Eyes: Negative. Respiratory: Negative. Cardiovascular: Negative. Gastrointestinal: Negative. Endocrine: Negative. Genitourinary: Negative. Musculoskeletal: Negative. Skin: Negative. Allergic/Immunologic: Negative. Neurological: Negative. Hematological: Negative. Psychiatric/Behavioral:  Positive for dysphoric mood and sleep disturbance. The patient is nervous/anxious.        Brief Medical Hx:   Patient Active Problem List   Diagnosis    Patient overweight    Hepatitis C virus infection without hepatic coma    Mixed hyperlipidemia    Chronic kidney disease    ADHD, predominantly inattentive type    Social anxiety disorder    Dysthymia    Borderline personality disorder ([facility]        Brief Psych Hx:  Hosp: 1x in 5/2021 when he was in the hospital 9 days for SI  Diagnoses: Childhood ADHD, anxiety, bipolar disorder, depression, schizophrenia per charting  Med trials: Adderall (recreationally trialed, was helpful), Bupropion (didn't help), Fluoxetine (paranoia), Quetiapine, sertraline (paced for 14h)  Outpt: Previously has engaged with psychiatrists and counselors. Last psychiatrist he was seeing he was fired from for missing appointments  NSSI: Cutting behaviors as recent as 2020. The patient does have significant scarring from these. Suicide Attempts: Identified 6 definable times when he attempted suicide in the past, though has had thoughts since he was 12-13. O:  Wt Readings from Last 3 Encounters:   11/03/22 170 lb (77.1 kg)   08/01/22 184 lb 6.4 oz (83.6 kg)   07/11/22 182 lb 10.1 oz (82.8 kg)       There were no vitals filed for this visit. Mental Status Exam:   Appearance:    Appropriately dressed  Motor: No abnormal movements, tics or mannerisms.   Eye Contact: Good for video  Speech:    Appropriate rate and rhythm  Language:   Appropriate diction  Mood/Affect:  \"Struggling at times\"/ Full range able to be elicited  Thought Process:   Linear, logical  Thought Content:    Some depressive content present, no SI/HI  Hallucinations:   Denied, not seen to be responding to IS  Associations:   Intact  Attention/Concentration:   Intact  Orientation:    Alert and oriented x4  Memory:   Intact  Fund of Knowledge:    Appropriate for age and education  Insight/Judgement:   Intact/intact      PHQ-9 Questionaire 2/23/2023 2/2/2023   Little interest or pleasure in doing things 1 1   Feeling down, depressed, or hopeless 2 1   Trouble falling or staying asleep, or sleeping too much 3 2   Feeling tired or having little energy 2 2   Poor appetite or overeating 2 0   Feeling bad about yourself - or that you are a failure or have let yourself or your family down 2 3   Trouble concentrating on things, such as reading the newspaper or watching television 1 2   Moving or speaking so slowly that other people could have noticed. Or the opposite - being so fidgety or restless that you have been moving around a lot more than usual 1 1   Thoughts that you would be better off dead, or of hurting yourself in some way 1 0   PHQ-9 Total Score 15 12   If you checked off any problems, how difficult have these problems made it for you to do your work, take care of things at home, or get along with other people? 2 2     BRANDON-7 SCREENING 2/23/2023 2/2/2023   Feeling nervous, anxious, or on edge More than half the days Nearly every day   Not being able to stop or control worrying Several days Several days   Worrying too much about different things More than half the days Nearly every day   Trouble relaxing Nearly every day Nearly every day   Being so restless that it is hard to sit still Several days Not at all   Becoming easily annoyed or irritable More than half the days Nearly every day   Feeling afraid as if something awful might happen More than half the days Not at all   BRANDON-7 Total Score 13 13   How difficult have these problems made it for you to do your work, take care of things at home, or get along with other people? Very difficult Somewhat difficult      Labs:     Orders Only on 07/28/2022   Component Date Value Ref Range Status    Cholesterol, Total 07/28/2022 142  0 - 199 mg/dL Final    Triglycerides 07/28/2022 101  0 - 150 mg/dL Final    HDL 07/28/2022 42  40 - 60 mg/dL Final    LDL Calculated 07/28/2022 80  <100 mg/dL Final    VLDL Cholesterol Calculated 07/28/2022 20  Not Established mg/dL Final    Valproic Acid, Free 07/28/2022 5.7 (A)  7.0 - 23.0 ug/mL Final    Valproic Acid Lvl 07/28/2022 59  50 - 125 ug/mL Corrected    [employer] [address] [place] [address] ([phone]    Valproic Dose amount 07/28/2022 UNK   Final    Valproic Date last dose 07/28/2022 UNK   Final    Valproic Time last dose 07/28/2022 UNK   Final    Valproic Acid % Free 07/28/2022 9.7  5.0 - 18.4 % Final    [employer]  [address] [place], [place] [address] ( [phone]    Sodium 07/28/2022 141  136 - 145 mmol/L Final    Potassium 07/28/2022 4.3  3.5 - 5.1 mmol/L Final    Chloride 07/28/2022 105  99 - 110 mmol/L Final    CO2 07/28/2022 25  21 - 32 mmol/L Final    Anion Gap 07/28/2022 11  3 - 16 Final    Glucose 07/28/2022 105 (A)  70 - 99 mg/dL Final    BUN 07/28/2022 15  7 - 20 mg/dL Final    Creatinine 07/28/2022 1.1  0.9 - 1.3 mg/dL Final    GFR Non- 07/28/2022 >60  >60 Final    Comment: >60 mL/min/1.73m2 EGFR, calc. for ages 25 and older using the  MDRD formula (not corrected for weight), is valid for stable  renal function. GFR  07/28/2022 >60  >60 Final    Comment: Chronic Kidney Disease: less than 60 ml/min/1.73 sq.m. Kidney Failure: less than 15 ml/min/1.73 sq.m. Results valid for patients 18 years and older.       Calcium 07/28/2022 8.6  8.3 - 10.6 mg/dL Final    Total Protein 07/28/2022 6.4  6.4 - 8.2 g/dL Final    Albumin 07/28/2022 4.0  3.4 - 5.0 g/dL Final    Albumin/Globulin Ratio 07/28/2022 1.7  1.1 - 2.2 Final    Total Bilirubin 07/28/2022 0.4  0.0 - 1.0 mg/dL Final    Alkaline Phosphatase 07/28/2022 44  40 - 129 U/L Final    ALT 07/28/2022 27  10 - 40 U/L Final    AST 07/28/2022 22  15 - 37 U/L Final       EKG: None on file        Celia Peacock MD  Psychiatrist

## 2023-02-02 ENCOUNTER — TELEMEDICINE (OUTPATIENT)
Dept: PSYCHIATRY | Age: 38
End: 2023-02-02
Payer: COMMERCIAL

## 2023-02-02 DIAGNOSIS — F60.3 BORDERLINE PERSONALITY DISORDER (HCC): ICD-10-CM

## 2023-02-02 DIAGNOSIS — F40.10 SOCIAL ANXIETY DISORDER: ICD-10-CM

## 2023-02-02 DIAGNOSIS — F90.0 ADHD, PREDOMINANTLY INATTENTIVE TYPE: Primary | ICD-10-CM

## 2023-02-02 DIAGNOSIS — F34.1 DYSTHYMIA: ICD-10-CM

## 2023-02-02 PROCEDURE — 1036F TOBACCO NON-USER: CPT | Performed by: STUDENT IN AN ORGANIZED HEALTH CARE EDUCATION/TRAINING PROGRAM

## 2023-02-02 PROCEDURE — G8427 DOCREV CUR MEDS BY ELIG CLIN: HCPCS | Performed by: STUDENT IN AN ORGANIZED HEALTH CARE EDUCATION/TRAINING PROGRAM

## 2023-02-02 PROCEDURE — G8417 CALC BMI ABV UP PARAM F/U: HCPCS | Performed by: STUDENT IN AN ORGANIZED HEALTH CARE EDUCATION/TRAINING PROGRAM

## 2023-02-02 PROCEDURE — 99214 OFFICE O/P EST MOD 30 MIN: CPT | Performed by: STUDENT IN AN ORGANIZED HEALTH CARE EDUCATION/TRAINING PROGRAM

## 2023-02-02 PROCEDURE — G8484 FLU IMMUNIZE NO ADMIN: HCPCS | Performed by: STUDENT IN AN ORGANIZED HEALTH CARE EDUCATION/TRAINING PROGRAM

## 2023-02-02 ASSESSMENT — PATIENT HEALTH QUESTIONNAIRE - PHQ9
SUM OF ALL RESPONSES TO PHQ9 QUESTIONS 1 & 2: 2
SUM OF ALL RESPONSES TO PHQ QUESTIONS 1-9: 12
3. TROUBLE FALLING OR STAYING ASLEEP: 2
6. FEELING BAD ABOUT YOURSELF - OR THAT YOU ARE A FAILURE OR HAVE LET YOURSELF OR YOUR FAMILY DOWN: 3
1. LITTLE INTEREST OR PLEASURE IN DOING THINGS: 1
9. THOUGHTS THAT YOU WOULD BE BETTER OFF DEAD, OR OF HURTING YOURSELF: 0
8. MOVING OR SPEAKING SO SLOWLY THAT OTHER PEOPLE COULD HAVE NOTICED. OR THE OPPOSITE, BEING SO FIGETY OR RESTLESS THAT YOU HAVE BEEN MOVING AROUND A LOT MORE THAN USUAL: 1
5. POOR APPETITE OR OVEREATING: 0
10. IF YOU CHECKED OFF ANY PROBLEMS, HOW DIFFICULT HAVE THESE PROBLEMS MADE IT FOR YOU TO DO YOUR WORK, TAKE CARE OF THINGS AT HOME, OR GET ALONG WITH OTHER PEOPLE: 2
SUM OF ALL RESPONSES TO PHQ QUESTIONS 1-9: 12
2. FEELING DOWN, DEPRESSED OR HOPELESS: 1
4. FEELING TIRED OR HAVING LITTLE ENERGY: 2
7. TROUBLE CONCENTRATING ON THINGS, SUCH AS READING THE NEWSPAPER OR WATCHING TELEVISION: 2

## 2023-02-02 ASSESSMENT — ANXIETY QUESTIONNAIRES
6. BECOMING EASILY ANNOYED OR IRRITABLE: 3
1. FEELING NERVOUS, ANXIOUS, OR ON EDGE: 3
5. BEING SO RESTLESS THAT IT IS HARD TO SIT STILL: 0
3. WORRYING TOO MUCH ABOUT DIFFERENT THINGS: 3
IF YOU CHECKED OFF ANY PROBLEMS ON THIS QUESTIONNAIRE, HOW DIFFICULT HAVE THESE PROBLEMS MADE IT FOR YOU TO DO YOUR WORK, TAKE CARE OF THINGS AT HOME, OR GET ALONG WITH OTHER PEOPLE: SOMEWHAT DIFFICULT
GAD7 TOTAL SCORE: 13
7. FEELING AFRAID AS IF SOMETHING AWFUL MIGHT HAPPEN: 0
2. NOT BEING ABLE TO STOP OR CONTROL WORRYING: 1
4. TROUBLE RELAXING: 3

## 2023-02-15 DIAGNOSIS — F40.10 SOCIAL ANXIETY DISORDER: ICD-10-CM

## 2023-02-15 RX ORDER — LORAZEPAM 1 MG/1
1 TABLET ORAL 2 TIMES DAILY PRN
Qty: 60 TABLET | Refills: 0 | Status: SHIPPED | OUTPATIENT
Start: 2023-02-15 | End: 2023-03-15 | Stop reason: SDUPTHER

## 2023-02-23 ENCOUNTER — OFFICE VISIT (OUTPATIENT)
Dept: PSYCHOLOGY | Age: 38
End: 2023-02-23

## 2023-02-23 DIAGNOSIS — F34.1 PERSISTENT DEPRESSIVE DISORDER: Primary | ICD-10-CM

## 2023-02-23 DIAGNOSIS — F29 PSYCHOSIS, UNSPECIFIED PSYCHOSIS TYPE (HCC): ICD-10-CM

## 2023-02-23 DIAGNOSIS — F41.9 ANXIETY: ICD-10-CM

## 2023-02-23 ASSESSMENT — PATIENT HEALTH QUESTIONNAIRE - PHQ9
6. FEELING BAD ABOUT YOURSELF - OR THAT YOU ARE A FAILURE OR HAVE LET YOURSELF OR YOUR FAMILY DOWN: 2
SUM OF ALL RESPONSES TO PHQ QUESTIONS 1-9: 15
8. MOVING OR SPEAKING SO SLOWLY THAT OTHER PEOPLE COULD HAVE NOTICED. OR THE OPPOSITE, BEING SO FIGETY OR RESTLESS THAT YOU HAVE BEEN MOVING AROUND A LOT MORE THAN USUAL: 1
SUM OF ALL RESPONSES TO PHQ QUESTIONS 1-9: 14
SUM OF ALL RESPONSES TO PHQ QUESTIONS 1-9: 15
3. TROUBLE FALLING OR STAYING ASLEEP: 3
SUM OF ALL RESPONSES TO PHQ9 QUESTIONS 1 & 2: 3
9. THOUGHTS THAT YOU WOULD BE BETTER OFF DEAD, OR OF HURTING YOURSELF: 1
5. POOR APPETITE OR OVEREATING: 2
4. FEELING TIRED OR HAVING LITTLE ENERGY: 2
2. FEELING DOWN, DEPRESSED OR HOPELESS: 2
10. IF YOU CHECKED OFF ANY PROBLEMS, HOW DIFFICULT HAVE THESE PROBLEMS MADE IT FOR YOU TO DO YOUR WORK, TAKE CARE OF THINGS AT HOME, OR GET ALONG WITH OTHER PEOPLE: 2
1. LITTLE INTEREST OR PLEASURE IN DOING THINGS: 1
SUM OF ALL RESPONSES TO PHQ QUESTIONS 1-9: 15
7. TROUBLE CONCENTRATING ON THINGS, SUCH AS READING THE NEWSPAPER OR WATCHING TELEVISION: 1

## 2023-02-23 ASSESSMENT — ANXIETY QUESTIONNAIRES
2. NOT BEING ABLE TO STOP OR CONTROL WORRYING: 1
7. FEELING AFRAID AS IF SOMETHING AWFUL MIGHT HAPPEN: 2
IF YOU CHECKED OFF ANY PROBLEMS ON THIS QUESTIONNAIRE, HOW DIFFICULT HAVE THESE PROBLEMS MADE IT FOR YOU TO DO YOUR WORK, TAKE CARE OF THINGS AT HOME, OR GET ALONG WITH OTHER PEOPLE: VERY DIFFICULT
1. FEELING NERVOUS, ANXIOUS, OR ON EDGE: 2
6. BECOMING EASILY ANNOYED OR IRRITABLE: 2
GAD7 TOTAL SCORE: 13
5. BEING SO RESTLESS THAT IT IS HARD TO SIT STILL: 1
3. WORRYING TOO MUCH ABOUT DIFFERENT THINGS: 2
4. TROUBLE RELAXING: 3
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
## 2023-02-23 NOTE — Clinical Note
Hi Dr. Domenico Knott is interested in NRT and wanted me to follow up with you. Thanks!  [employer]

## 2023-02-23 NOTE — PATIENT INSTRUCTIONS
Return to see Dr. Nancy Dennison in 2 weeks  Begin to incorporate more pleasurable activities and responsibilities into routine to aid in mood  Practice box breathing to aid in anxiety and stress  Call PC office if mood significantly worsens or you begin experiencing suicidal thoughts; if an emergency, go to the nearest ER, call 911, call crisis hotlines    How to do Box Breathing  Step 1: Breathe in counting to four slowly. Feel the air enter your lungs. Step 2: Hold your breath for 4 seconds. Try to avoid inhaling or exhaling for 4 seconds. Step 3: Slowly exhale through your mouth for 4 seconds. Step 4: Repeat steps 1 to 3 until you feel re-centered. Contact the numbers below if your mood becomes significantly worse, or if you have more serious thoughts of suicide or self harm. [place] Warmline  (8997 113 02 88) 451-WARM (4155)  [employer]. [place]    The [employer] is a peer resource available 24/7. The Warmline provides a safe, confidential place to talk and be heard. [address] [facility]  (523) 079-CARE (2219)  National Suicide Prevention Lifeline    (540) 791-TALK (0879)  www.suicidepreventionlifeline. org    Suicide and Crisis Lifeline  Dial [address] [facility]    (023) [place] (308-9711)  Www.youthline.     Jeanette Frazier [facility] (PES): [address]  [address] [facility]    [phone] and [address]  Text 310839  www. veteranscrisisline. net

## 2023-02-24 DIAGNOSIS — F34.1 DYSTHYMIA: ICD-10-CM

## 2023-02-24 DIAGNOSIS — F90.0 ADHD, PREDOMINANTLY INATTENTIVE TYPE: ICD-10-CM

## 2023-02-25 DIAGNOSIS — F90.0 ADHD, PREDOMINANTLY INATTENTIVE TYPE: ICD-10-CM

## 2023-02-27 RX ORDER — TRAZODONE HYDROCHLORIDE 100 MG/1
100 TABLET ORAL NIGHTLY
Qty: 30 TABLET | Refills: 2 | Status: SHIPPED | OUTPATIENT
Start: 2023-02-27 | End: 2023-03-29 | Stop reason: SDUPTHER

## 2023-02-27 RX ORDER — DEXTROAMPHETAMINE SACCHARATE, AMPHETAMINE ASPARTATE, DEXTROAMPHETAMINE SULFATE AND AMPHETAMINE SULFATE 2.5; 2.5; 2.5; 2.5 MG/1; MG/1; MG/1; MG/1
10 TABLET ORAL 2 TIMES DAILY
Qty: 60 TABLET | Refills: 0 | OUTPATIENT
Start: 2023-02-27 | End: 2023-03-29

## 2023-02-27 RX ORDER — DEXTROAMPHETAMINE SACCHARATE, AMPHETAMINE ASPARTATE, DEXTROAMPHETAMINE SULFATE AND AMPHETAMINE SULFATE 2.5; 2.5; 2.5; 2.5 MG/1; MG/1; MG/1; MG/1
10 TABLET ORAL 2 TIMES DAILY
Qty: 60 TABLET | Refills: 0 | Status: SHIPPED | OUTPATIENT
Start: 2023-02-27 | End: 2023-03-29 | Stop reason: SDUPTHER

## 2023-03-14 ENCOUNTER — OFFICE VISIT (OUTPATIENT)
Dept: PSYCHOLOGY | Age: 38
End: 2023-03-14

## 2023-03-14 DIAGNOSIS — F29 PSYCHOSIS, UNSPECIFIED PSYCHOSIS TYPE (HCC): ICD-10-CM

## 2023-03-14 DIAGNOSIS — F34.1 PERSISTENT DEPRESSIVE DISORDER: Primary | ICD-10-CM

## 2023-03-14 DIAGNOSIS — F41.9 ANXIETY: ICD-10-CM

## 2023-03-14 ASSESSMENT — ANXIETY QUESTIONNAIRES
3. WORRYING TOO MUCH ABOUT DIFFERENT THINGS: 3
1. FEELING NERVOUS, ANXIOUS, OR ON EDGE: 3
IF YOU CHECKED OFF ANY PROBLEMS ON THIS QUESTIONNAIRE, HOW DIFFICULT HAVE THESE PROBLEMS MADE IT FOR YOU TO DO YOUR WORK, TAKE CARE OF THINGS AT HOME, OR GET ALONG WITH OTHER PEOPLE: EXTREMELY DIFFICULT
4. TROUBLE RELAXING: 3
5. BEING SO RESTLESS THAT IT IS HARD TO SIT STILL: 1
GAD7 TOTAL SCORE: 19
6. BECOMING EASILY ANNOYED OR IRRITABLE: 3
2. NOT BEING ABLE TO STOP OR CONTROL WORRYING: 3
7. FEELING AFRAID AS IF SOMETHING AWFUL MIGHT HAPPEN: 3

## 2023-03-14 ASSESSMENT — PATIENT HEALTH QUESTIONNAIRE - PHQ9
8. MOVING OR SPEAKING SO SLOWLY THAT OTHER PEOPLE COULD HAVE NOTICED. OR THE OPPOSITE, BEING SO FIGETY OR RESTLESS THAT YOU HAVE BEEN MOVING AROUND A LOT MORE THAN USUAL: 3
5. POOR APPETITE OR OVEREATING: 3
SUM OF ALL RESPONSES TO PHQ QUESTIONS 1-9: 25
7. TROUBLE CONCENTRATING ON THINGS, SUCH AS READING THE NEWSPAPER OR WATCHING TELEVISION: 3
3. TROUBLE FALLING OR STAYING ASLEEP: 3
1. LITTLE INTEREST OR PLEASURE IN DOING THINGS: 3
2. FEELING DOWN, DEPRESSED OR HOPELESS: 3
6. FEELING BAD ABOUT YOURSELF - OR THAT YOU ARE A FAILURE OR HAVE LET YOURSELF OR YOUR FAMILY DOWN: 3
SUM OF ALL RESPONSES TO PHQ QUESTIONS 1-9: 25
SUM OF ALL RESPONSES TO PHQ9 QUESTIONS 1 & 2: 6
9. THOUGHTS THAT YOU WOULD BE BETTER OFF DEAD, OR OF HURTING YOURSELF: 1
SUM OF ALL RESPONSES TO PHQ QUESTIONS 1-9: 25
4. FEELING TIRED OR HAVING LITTLE ENERGY: 3
SUM OF ALL RESPONSES TO PHQ QUESTIONS 1-9: 24
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
## 2023-03-14 NOTE — PATIENT INSTRUCTIONS
Return to see Dr. Clifford Miramontes in 2 weeks   Engage in relaxation strategies to aid in stress management, such as being outdoors  Practice box breathing daily to aid in anxiety and stress  Review sleep hygiene guidelines below  Review strategies to aid in attention  If you begin having suicidal thoughts or if suicidal thoughts worsen, go to nearest ER, call 911, call crisis hotlines    How to do Box Breathing  Step 1: Breathe in counting to four slowly. Feel the air enter your lungs. Step 2: Hold your breath for 4 seconds. Try to avoid inhaling or exhaling for 4 seconds. Step 3: Slowly exhale through your mouth for 4 seconds. Step 4: Repeat steps 1 to 3 until you feel re-centered. Sleep Hygiene Guidelines    Good dental hygiene is important in determining the health of your teeth and gums. We all know we are supposed to brush and floss regularly. Those who do so are more likely to have strong, healthy gums and less cavities. Similarly, good sleep hygiene is important in determining the quality and quantity of your sleep. Below are guidelines for good sleep hygiene practices. Review these guidelines and evaluate how well you practice good sleep hygiene. Caffeine:  Avoid Caffeine After 10AM  Caffeine disturbs sleep, even in people who do not think they experience a stimulation effect. Individuals with insomnia are often more sensitive to mild stimulants than are normal sleepers. Caffeine is found in items such as coffee, tea, soda, chocolate, and many over-the-counter medications (e.g., Excedrin). Its effects can last up to 12 hours. Thus, drinking caffeinated beverages should be avoided after 10AM, and should be limited to no more than two cups. You might consider a trial period of no caffeine if you tend to be sensitive to its effects. Nicotine:  Avoid Nicotine Before Bedtime  Although some smokers claim that smoking helps them relax, nicotine is a stimulant.   The relaxing effects occur with the initial entry of the nicotine, but as the nicotine builds in the system it produces an effect similar to caffeine. Thus, smoking, dipping, or chewing tobacco should be avoided near bedtime and during the night. Dont smoke to get yourself back to sleep. Alcohol:  Avoid Alcohol After Dinner  Alcohol often promotes the onset of sleep, but as alcohol is metabolized sleep becomes disturbed and fragmented. Thus,alcohol is a poor sleep aid and should not be used as such. Limit alcohol use to small quantities (e.g., no more than two drinks). It is best not to drink alcohol after 7PM, as alcohol may not be metabolized before going to sleep and will likely affect sleep. Sleeping Pills:  Sleep Medications are Effective Only Temporarily  Scientists have shown that sleep medications lose their effectiveness in about 2 - 4 weeks when taken regularly. Despite advertisements to the contrary, over-the-counter sleeping aids have little impact on sleep beyond the placebo effect. Over time, sleeping pills actually can make sleep problems worse. When sleeping pills have been used for a long period, withdrawal from the medication can lead to an insomnia rebound. Thus, after long-term use, many individuals incorrectly conclude that they need sleeping pills in order to sleep normally. Keep use of sleep pills infrequent, but dont worry if you need to use one on an occasional basis. Regular Exercise  Get regular exercise, preferably 40 minutes each day of an activity that causes sweating. Exercise in the late afternoon or early evening (i.e., 3-6 hours before bed) seems to aid sleep, although the positive effect often takes several weeks to become noticeable. Exercising sporadically is not likely to improve sleep, and exercise within 2 hours of bedtime may elevate nervous system activity and interfere with sleep onset.       Hot Baths  Spending 20 minutes in a tub of hot water an hour or two prior to bedtime may promote sleep and is strongly recommended. Bedroom Environment: Moderate Temperature, Quiet, and Dark  Extremes of heat or cold can disrupt sleep. A quiet environment is more sleep promoting than a noisy one. Noises can be masked with background white noise (such as the noise of a fan) or with earplugs. Bedrooms may be darkened with black-out shades or sleep masks can be worn. Position clocks out-of-sight since clock-watching can increase worry about the effects of lack of sleep. Be sure your mattress is not too soft or too firm and that your pillow is the right height and firmness. Eating  A light bedtime snack, such a glass of warm milk, cheese, or a bowl of cereal, can promote sleep. You should avoid the following foods at bedtime:  any caffeinated foods (e.g., chocolate), peanuts, beans, most raw fruits and vegetables (since they may cause gas), and high-fat foods such as potato chips or corn chips. Avoid snacks in the middle of the nights since awakening may become associated with hunger. If you have trouble with regurgitation, be especially careful to avoid heavy meals and spices in the evening. Do not go to bed too hungry or too full. It may help to elevate your head with some pillows. Allow Yourself at [facility] an Hour Before Bedtime to Unwind  Find what works for you to wind down, and perhaps give yourself an hour to do so. Regular Sleep Schedule   Keep a regular time each day, 7 days a week, to get out of bed. Keeping a regular waking time helps set your circadian rhythm so that your body learns to sleep at the desired time. Set a Reasonable Bedtime and Arising Time and Stick to Them   Set the alarm clock and get out of bed at the same time each morning, weekdays and weekends, regardless of your bedtime or the amount of sleep you obtained on the previous night. This guideline is designed to regulate your internal biological clock and reset your clock.       Guidelines for Sleep Stimulus Control    Set a Reasonable Bedtime and Arising Time and Stick to Them  Spending excessive time in bed has two unfortunate consequences: (1) you begin to associate your bedroom with arousal and frustration and (2) your sleep actually becomes more shallow. Restrict your sleep period to the average number of hours you have actually slept per night during the preceding week, plus one additional hour. Set the alarm clock and get out of bed at the same time each morning, weekdays and weekends, regardless of your bedtime or the amount of sleep you obtained on the previous night. You probably will be tempted to stay in bed in the morning if you did not sleep well, but try to maintain your new schedule. This guideline is designed to regulate your internal biological clock and reset your sleep-wake rhythm. Go to Bed Only When You Are Sleepy  There is no reason to go to bed if you are not sleepy. When you go to bed too early, it only gives you more time to become frustrated with your inability to sleep. Individuals often ponder the events of the day, plan the next day's schedule, or worry about their inability to fall asleep. You should therefore delay your bedtime until you are sleepy. This may mean that you go to bed even later than your scheduled bedtime. Remember to stick to your scheduled arising time regardless of the time you go to bed. Get Out of Bed When You Can't Fall Asleep or Go Back to Sleep in About 20-30 Minutes. Return to Bed Only When You Are Sleepy. Repeat This Step as Often as Necessary. Too much time in bed can decrease the sleep quality on subsequent nights and contribute to the maintenance of existing sleep problems. Dont lay in bed for extended times while awake. Although we don't want you to be a clock-watcher, get out of bed if you don't fall asleep fairly soon (i.e., 20-30 minutes). Use this time to engage in a quiet activity (e.g., reading by a soft light).  Return to bed only when you are sleepy (e.g., yawning, head bobbing, eyes closing, concentration decreasing). Dont confuse tiredness with sleepiness; they are different. Tiredness doesnt lead to sleep, only sleepiness does. The object is for you to reconnect your bed with sleeping rather than frustration. It will be demanding to follow this instruction, but many people have found ways to adhere to this guideline. Use the Bed or Bedroom for Sleep and Sex Only. Do NOT Watch TV, Listen to the Radio, Eat, or Read in Your Bedroom. The purpose of this guideline is to associate your bedroom with sleep rather than wakefulness. Just as you may associate the kitchen with hunger, this guideline will help you associate sleep with your bedroom. Follow this rule both during the day and at night. You may have to temporarily move the TV or radio from your bedroom to help you during treatment. If you have difficulty falling asleep without background noise, you can use neutral noise (e.g., white noise machine, fan) to help you fall asleep. If you must use music or the television, recognize that this noise may help you fall asleep but can actually disrupt your sleep later on. Set a timer so the noise will end after 45 minutes. Do Not Nap During the Day. Most sleep experts agree that daytime napping almost always disrupts sleep rhythm, resulting in lighter, more restless sleep, difficulty falling asleep, or early morning awakening. This guideline will help your body acquire a consistent sleep rhythm so that you feel drowsy and ready to sleep at about the same time each night. If you must nap, keep it brief, and take the nap about 8 hours after arising (no later than 4PM). It is best to set an alarm to ensure you dont sleep more than 30 minutes.              Managing Attention Deficits  Increase Attention by:   Decreasing Stress  Increasing Interest in a Task  Prioritizing  Saying No to Unreasonable Demands  Increase Focus by:   Setting Goals  Creating a [place] Statement  Creating a Not-To-Do List  Managing Impulsivity and Hyperactivity  Decrease Impulsivity by:   Pausing  Increasing Detachment  Asking \"What Did You Mean By That? \"  Managing Anger  Listing Consequences  Translating Complaints into Requests  Practicing Communication Skills  Increasing Positive Self-talk  Decrease Hyperactivity by: Increasing Self-Care  Increasing Exercise and Relaxation  Increase Persistence by:   Managing Emotions  Increasing Awareness of Benefits  Decreasing Self-Criticism  Increasing Rewards  Increase Task Completion by:   Predicting Obstacles  Identifying Unmet Needs  Asking for Support  Create Real World Success  Increase Motivation by:   Decreasing Depletion  Noticing Progress  Focusing on Positive Feelings  Asking \"Why do I want to change? \"  Increase Confidence by: Identifying Strengths  Listing Past Successes  Increase Organization by:   Reframing  Decreasing Emotional Upsets  Utilizing Technology  Increase Time Management by:   Taking 10 minutes Each Morning  Building New Habits  Using Prompts and Reminders         Contact the numbers below if your mood becomes significantly worse, or if you have more serious thoughts of suicide or self harm. [place] Warmline  (6183 299 04 24 931WARM (2370)  [employer] mobile. "[employer]    The [place] is a peer resource available 24/7. The Warmline provides a safe, confidential place to talk and be heard. [address] [facility]  (076) 281-CARE (6735)  National Suicide Prevention Lifeline    (404) 021-TALK (0593)  www.suicidepreventionlifeline. org    Suicide and Crisis Lifeline  Dial [facility]    (890) [place] (771-2733)  Www.youthline.     Jeanette Frazier [facility] ([facility]): [address]  [address] [facility], [facility]    [phone] and [address]  Text 054021  www. veteranscrisisline. net

## 2023-03-15 DIAGNOSIS — F40.10 SOCIAL ANXIETY DISORDER: ICD-10-CM

## 2023-03-15 RX ORDER — LORAZEPAM 1 MG/1
1 TABLET ORAL 2 TIMES DAILY PRN
Qty: 60 TABLET | Refills: 0 | Status: SHIPPED | OUTPATIENT
Start: 2023-03-15 | End: 2023-04-13 | Stop reason: SDUPTHER

## 2023-03-19 DIAGNOSIS — F34.1 DYSTHYMIA: ICD-10-CM

## 2023-03-19 DIAGNOSIS — F60.3 BORDERLINE PERSONALITY DISORDER (HCC): ICD-10-CM

## 2023-03-20 RX ORDER — RISPERIDONE 0.5 MG/1
0.5 TABLET ORAL NIGHTLY
Qty: 30 TABLET | Refills: 2 | Status: SHIPPED | OUTPATIENT
Start: 2023-03-20

## 2023-03-29 DIAGNOSIS — F34.1 DYSTHYMIA: ICD-10-CM

## 2023-03-29 DIAGNOSIS — F90.0 ADHD, PREDOMINANTLY INATTENTIVE TYPE: ICD-10-CM

## 2023-03-29 RX ORDER — TRAZODONE HYDROCHLORIDE 100 MG/1
100 TABLET ORAL NIGHTLY
Qty: 30 TABLET | Refills: 2 | Status: SHIPPED | OUTPATIENT
Start: 2023-03-29

## 2023-03-29 RX ORDER — DEXTROAMPHETAMINE SACCHARATE, AMPHETAMINE ASPARTATE, DEXTROAMPHETAMINE SULFATE AND AMPHETAMINE SULFATE 2.5; 2.5; 2.5; 2.5 MG/1; MG/1; MG/1; MG/1
10 TABLET ORAL 2 TIMES DAILY
Qty: 60 TABLET | Refills: 0 | Status: SHIPPED | OUTPATIENT
Start: 2023-03-29 | End: 2023-04-28

## 2023-03-30 ENCOUNTER — OFFICE VISIT (OUTPATIENT)
Dept: PSYCHOLOGY | Age: 38
End: 2023-03-30

## 2023-03-30 DIAGNOSIS — F34.1 PERSISTENT DEPRESSIVE DISORDER: Primary | ICD-10-CM

## 2023-03-30 DIAGNOSIS — F29 PSYCHOSIS, UNSPECIFIED PSYCHOSIS TYPE (HCC): ICD-10-CM

## 2023-03-30 DIAGNOSIS — F41.9 ANXIETY: ICD-10-CM

## 2023-03-30 ASSESSMENT — PATIENT HEALTH QUESTIONNAIRE - PHQ9
1. LITTLE INTEREST OR PLEASURE IN DOING THINGS: 1
9. THOUGHTS THAT YOU WOULD BE BETTER OFF DEAD, OR OF HURTING YOURSELF: 1
SUM OF ALL RESPONSES TO PHQ QUESTIONS 1-9: 16
10. IF YOU CHECKED OFF ANY PROBLEMS, HOW DIFFICULT HAVE THESE PROBLEMS MADE IT FOR YOU TO DO YOUR WORK, TAKE CARE OF THINGS AT HOME, OR GET ALONG WITH OTHER PEOPLE: 3
SUM OF ALL RESPONSES TO PHQ QUESTIONS 1-9: 15
8. MOVING OR SPEAKING SO SLOWLY THAT OTHER PEOPLE COULD HAVE NOTICED. OR THE OPPOSITE, BEING SO FIGETY OR RESTLESS THAT YOU HAVE BEEN MOVING AROUND A LOT MORE THAN USUAL: 1
SUM OF ALL RESPONSES TO PHQ QUESTIONS 1-9: 16
2. FEELING DOWN, DEPRESSED OR HOPELESS: 2
6. FEELING BAD ABOUT YOURSELF - OR THAT YOU ARE A FAILURE OR HAVE LET YOURSELF OR YOUR FAMILY DOWN: 2
5. POOR APPETITE OR OVEREATING: 3
SUM OF ALL RESPONSES TO PHQ QUESTIONS 1-9: 16
3. TROUBLE FALLING OR STAYING ASLEEP: 2
SUM OF ALL RESPONSES TO PHQ9 QUESTIONS 1 & 2: 3
4. FEELING TIRED OR HAVING LITTLE ENERGY: 2
7. TROUBLE CONCENTRATING ON THINGS, SUCH AS READING THE NEWSPAPER OR WATCHING TELEVISION: 2

## 2023-03-30 ASSESSMENT — ANXIETY QUESTIONNAIRES
IF YOU CHECKED OFF ANY PROBLEMS ON THIS QUESTIONNAIRE, HOW DIFFICULT HAVE THESE PROBLEMS MADE IT FOR YOU TO DO YOUR WORK, TAKE CARE OF THINGS AT HOME, OR GET ALONG WITH OTHER PEOPLE: EXTREMELY DIFFICULT
3. WORRYING TOO MUCH ABOUT DIFFERENT THINGS: 3
7. FEELING AFRAID AS IF SOMETHING AWFUL MIGHT HAPPEN: 3
6. BECOMING EASILY ANNOYED OR IRRITABLE: 3
GAD7 TOTAL SCORE: 18
2. NOT BEING ABLE TO STOP OR CONTROL WORRYING: 3
1. FEELING NERVOUS, ANXIOUS, OR ON EDGE: 3
4. TROUBLE RELAXING: 2
5. BEING SO RESTLESS THAT IT IS HARD TO SIT STILL: 1

## 2023-03-30 NOTE — PATIENT INSTRUCTIONS
Return to see Dr. Ebonie Andre in 2 weeks  Use calendars on phone or set alarm reminders for appointments as a memory aid   Continue to engage in stress management strategies   If you begin having suicidal thoughts or if suicidal thoughts worsen, go to nearest ER, call 911, call crisis hotlines

## 2023-03-30 NOTE — PROGRESS NOTES
Relation Age of Onset    Depression Mother     High Blood Pressure Father     Alcohol Abuse Father     Bipolar Disorder Maternal Grandfather     High Blood Pressure Paternal Grandfather        A:  Patient engaged and cooperative. Pt endorsed PDW when he gets upset but denied active suicidal ideation, plan, intent. Denied current cutting behaviors. Insight and motivation are good. Re-administered PHQ-9 and BRANDON-7 (see below). Patient endorses Moderately Severe symptoms of depression and Severe symptoms of anxiety. PHQ-9 Questionaire 3/30/2023 3/14/2023 2/23/2023 2/2/2023 11/3/2022 9/1/2022 8/1/2022   Little interest or pleasure in doing things 1 3 1 1 2 3 2   Feeling down, depressed, or hopeless 2 3 2 1 1 3 2   Trouble falling or staying asleep, or sleeping too much 2 3 3 2 3 3 3   Feeling tired or having little energy 2 3 2 2 1 3 1   Poor appetite or overeating 3 3 2 0 2 3 3   Feeling bad about yourself - or that you are a failure or have let yourself or your family down 2 3 2 3 2 2 3   Trouble concentrating on things, such as reading the newspaper or watching television 2 3 1 2 3 3 3   Moving or speaking so slowly that other people could have noticed. Or the opposite - being so fidgety or restless that you have been moving around a lot more than usual 1 3 1 1 3 0 3   Thoughts that you would be better off dead, or of hurting yourself in some way 1 1 1 0 1 0 2   PHQ-9 Total Score 16 25 15 12 18 20 22   If you checked off any problems, how difficult have these problems made it for you to do your work, take care of things at home, or get along with other people?  3 - 2 2 3 3 3     PHQ Scores 3/30/2023 3/14/2023 2/23/2023 2/2/2023 11/3/2022 9/1/2022 8/1/2022   PHQ2 Score 3 6 3 2 3 6 4   PHQ9 Score 16 25 15 12 18 20 22       Interpretation of Total Score Depression Severity: 1-4 = Minimal depression, 5-9 = Mild depression, 10-14 = Moderate depression, 15-19 = Moderately severe depression, 20-27 = Severe depression

## 2023-04-18 ENCOUNTER — TELEMEDICINE (OUTPATIENT)
Dept: PSYCHOLOGY | Age: 38
End: 2023-04-18

## 2023-04-18 DIAGNOSIS — F41.9 ANXIETY: ICD-10-CM

## 2023-04-18 DIAGNOSIS — F29 PSYCHOSIS, UNSPECIFIED PSYCHOSIS TYPE (HCC): ICD-10-CM

## 2023-04-18 DIAGNOSIS — F34.1 PERSISTENT DEPRESSIVE DISORDER: Primary | ICD-10-CM

## 2023-04-24 ENCOUNTER — OFFICE VISIT (OUTPATIENT)
Dept: PSYCHIATRY | Age: 38
End: 2023-04-24
Payer: COMMERCIAL

## 2023-04-24 VITALS
HEART RATE: 87 BPM | WEIGHT: 187 LBS | DIASTOLIC BLOOD PRESSURE: 78 MMHG | BODY MASS INDEX: 28.43 KG/M2 | OXYGEN SATURATION: 93 % | SYSTOLIC BLOOD PRESSURE: 112 MMHG

## 2023-04-24 DIAGNOSIS — F90.0 ADHD, PREDOMINANTLY INATTENTIVE TYPE: Primary | ICD-10-CM

## 2023-04-24 DIAGNOSIS — F34.1 DYSTHYMIA: ICD-10-CM

## 2023-04-24 DIAGNOSIS — F43.23 ADJUSTMENT REACTION WITH ANXIETY AND DEPRESSION: ICD-10-CM

## 2023-04-24 DIAGNOSIS — F60.3 BORDERLINE PERSONALITY DISORDER (HCC): ICD-10-CM

## 2023-04-24 DIAGNOSIS — F40.10 SOCIAL ANXIETY DISORDER: ICD-10-CM

## 2023-04-24 PROCEDURE — 1036F TOBACCO NON-USER: CPT | Performed by: STUDENT IN AN ORGANIZED HEALTH CARE EDUCATION/TRAINING PROGRAM

## 2023-04-24 PROCEDURE — 99214 OFFICE O/P EST MOD 30 MIN: CPT | Performed by: STUDENT IN AN ORGANIZED HEALTH CARE EDUCATION/TRAINING PROGRAM

## 2023-04-24 PROCEDURE — G8417 CALC BMI ABV UP PARAM F/U: HCPCS | Performed by: STUDENT IN AN ORGANIZED HEALTH CARE EDUCATION/TRAINING PROGRAM

## 2023-04-24 PROCEDURE — G8427 DOCREV CUR MEDS BY ELIG CLIN: HCPCS | Performed by: STUDENT IN AN ORGANIZED HEALTH CARE EDUCATION/TRAINING PROGRAM

## 2023-04-24 RX ORDER — DIVALPROEX SODIUM 500 MG/1
1000 TABLET, DELAYED RELEASE ORAL 2 TIMES DAILY
Qty: 120 TABLET | Refills: 2 | Status: SHIPPED | OUTPATIENT
Start: 2023-04-24

## 2023-04-24 RX ORDER — DEXTROAMPHETAMINE SACCHARATE, AMPHETAMINE ASPARTATE, DEXTROAMPHETAMINE SULFATE AND AMPHETAMINE SULFATE 2.5; 2.5; 2.5; 2.5 MG/1; MG/1; MG/1; MG/1
10 TABLET ORAL 2 TIMES DAILY
Qty: 60 TABLET | Refills: 0 | Status: SHIPPED | OUTPATIENT
Start: 2023-04-26 | End: 2023-05-26

## 2023-04-24 ASSESSMENT — PATIENT HEALTH QUESTIONNAIRE - PHQ9
SUM OF ALL RESPONSES TO PHQ9 QUESTIONS 1 & 2: 5
5. POOR APPETITE OR OVEREATING: 3
SUM OF ALL RESPONSES TO PHQ QUESTIONS 1-9: 24
6. FEELING BAD ABOUT YOURSELF - OR THAT YOU ARE A FAILURE OR HAVE LET YOURSELF OR YOUR FAMILY DOWN: 3
SUM OF ALL RESPONSES TO PHQ QUESTIONS 1-9: 24
4. FEELING TIRED OR HAVING LITTLE ENERGY: 2
SUM OF ALL RESPONSES TO PHQ QUESTIONS 1-9: 24
1. LITTLE INTEREST OR PLEASURE IN DOING THINGS: 2
9. THOUGHTS THAT YOU WOULD BE BETTER OFF DEAD, OR OF HURTING YOURSELF: 3
SUM OF ALL RESPONSES TO PHQ QUESTIONS 1-9: 21
8. MOVING OR SPEAKING SO SLOWLY THAT OTHER PEOPLE COULD HAVE NOTICED. OR THE OPPOSITE, BEING SO FIGETY OR RESTLESS THAT YOU HAVE BEEN MOVING AROUND A LOT MORE THAN USUAL: 3
10. IF YOU CHECKED OFF ANY PROBLEMS, HOW DIFFICULT HAVE THESE PROBLEMS MADE IT FOR YOU TO DO YOUR WORK, TAKE CARE OF THINGS AT HOME, OR GET ALONG WITH OTHER PEOPLE: 3
3. TROUBLE FALLING OR STAYING ASLEEP: 3
7. TROUBLE CONCENTRATING ON THINGS, SUCH AS READING THE NEWSPAPER OR WATCHING TELEVISION: 2
2. FEELING DOWN, DEPRESSED OR HOPELESS: 3

## 2023-04-24 ASSESSMENT — ANXIETY QUESTIONNAIRES
IF YOU CHECKED OFF ANY PROBLEMS ON THIS QUESTIONNAIRE, HOW DIFFICULT HAVE THESE PROBLEMS MADE IT FOR YOU TO DO YOUR WORK, TAKE CARE OF THINGS AT HOME, OR GET ALONG WITH OTHER PEOPLE: EXTREMELY DIFFICULT
5. BEING SO RESTLESS THAT IT IS HARD TO SIT STILL: 2
4. TROUBLE RELAXING: 3
1. FEELING NERVOUS, ANXIOUS, OR ON EDGE: 3
3. WORRYING TOO MUCH ABOUT DIFFERENT THINGS: 3
6. BECOMING EASILY ANNOYED OR IRRITABLE: 3
GAD7 TOTAL SCORE: 20
2. NOT BEING ABLE TO STOP OR CONTROL WORRYING: 3
7. FEELING AFRAID AS IF SOMETHING AWFUL MIGHT HAPPEN: 3

## 2023-04-26 ENCOUNTER — OFFICE VISIT (OUTPATIENT)
Dept: INTERNAL MEDICINE CLINIC | Age: 38
End: 2023-04-26
Payer: COMMERCIAL

## 2023-04-26 VITALS
BODY MASS INDEX: 28.43 KG/M2 | HEART RATE: 93 BPM | OXYGEN SATURATION: 96 % | DIASTOLIC BLOOD PRESSURE: 70 MMHG | SYSTOLIC BLOOD PRESSURE: 112 MMHG | WEIGHT: 187 LBS

## 2023-04-26 DIAGNOSIS — Z72.0 SMOKING TRYING TO QUIT: ICD-10-CM

## 2023-04-26 DIAGNOSIS — E78.2 MIXED HYPERLIPIDEMIA: ICD-10-CM

## 2023-04-26 DIAGNOSIS — R73.9 ELEVATED BLOOD SUGAR: ICD-10-CM

## 2023-04-26 DIAGNOSIS — N18.9 CHRONIC KIDNEY DISEASE, UNSPECIFIED CKD STAGE: ICD-10-CM

## 2023-04-26 DIAGNOSIS — R07.9 CHEST PAIN, UNSPECIFIED TYPE: Primary | ICD-10-CM

## 2023-04-26 DIAGNOSIS — B19.20 HEPATITIS C VIRUS INFECTION WITHOUT HEPATIC COMA, UNSPECIFIED CHRONICITY: ICD-10-CM

## 2023-04-26 DIAGNOSIS — K21.9 GASTROESOPHAGEAL REFLUX DISEASE WITHOUT ESOPHAGITIS: ICD-10-CM

## 2023-04-26 PROCEDURE — G8427 DOCREV CUR MEDS BY ELIG CLIN: HCPCS | Performed by: INTERNAL MEDICINE

## 2023-04-26 PROCEDURE — 1036F TOBACCO NON-USER: CPT | Performed by: INTERNAL MEDICINE

## 2023-04-26 PROCEDURE — 99214 OFFICE O/P EST MOD 30 MIN: CPT | Performed by: INTERNAL MEDICINE

## 2023-04-26 PROCEDURE — 93000 ELECTROCARDIOGRAM COMPLETE: CPT | Performed by: INTERNAL MEDICINE

## 2023-04-26 PROCEDURE — G8417 CALC BMI ABV UP PARAM F/U: HCPCS | Performed by: INTERNAL MEDICINE

## 2023-04-26 RX ORDER — NICOTINE 21 MG/24HR
PATCH, TRANSDERMAL 24 HOURS TRANSDERMAL
Qty: 28 PATCH | Refills: 1 | Status: CANCELLED | OUTPATIENT
Start: 2023-04-26

## 2023-04-26 RX ORDER — PANTOPRAZOLE SODIUM 40 MG/1
TABLET, DELAYED RELEASE ORAL
Qty: 90 TABLET | Refills: 1 | Status: SHIPPED | OUTPATIENT
Start: 2023-04-26

## 2023-04-26 RX ORDER — NICOTINE 21 MG/24HR
1 PATCH, TRANSDERMAL 24 HOURS TRANSDERMAL DAILY
Qty: 14 PATCH | Refills: 5 | Status: SHIPPED | OUTPATIENT
Start: 2023-04-26 | End: 2023-05-10

## 2023-04-26 SDOH — ECONOMIC STABILITY: INCOME INSECURITY: HOW HARD IS IT FOR YOU TO PAY FOR THE VERY BASICS LIKE FOOD, HOUSING, MEDICAL CARE, AND HEATING?: VERY HARD

## 2023-04-26 SDOH — ECONOMIC STABILITY: HOUSING INSECURITY
IN THE LAST 12 MONTHS, WAS THERE A TIME WHEN YOU DID NOT HAVE A STEADY PLACE TO SLEEP OR SLEPT IN A SHELTER (INCLUDING NOW)?: NO

## 2023-04-26 SDOH — ECONOMIC STABILITY: FOOD INSECURITY: WITHIN THE PAST 12 MONTHS, THE FOOD YOU BOUGHT JUST DIDN'T LAST AND YOU DIDN'T HAVE MONEY TO GET MORE.: NEVER TRUE

## 2023-04-26 SDOH — ECONOMIC STABILITY: FOOD INSECURITY: WITHIN THE PAST 12 MONTHS, YOU WORRIED THAT YOUR FOOD WOULD RUN OUT BEFORE YOU GOT MONEY TO BUY MORE.: SOMETIMES TRUE

## 2023-04-26 NOTE — PROGRESS NOTES
Nickolas Chase (:  1985) is a 40 y.o. male,Established patient, here for evaluation of the following chief complaint(s):  Follow-up      Vitals:    23 0951   BP: 112/70   Pulse: 93   SpO2: 96%        ASSESSMENT/PLAN:  1. Chest pain, unspecified type  Atypical chest pain. Ekg with NSR no st-t wave changes. Patient does have some increased risk factors for CAD including HLD and smoking however given that it does not happen on exertion and normal EKG and given the location of pain being more under patients ribs I do think this is atypical and may be related to GERD. Will hold off on further cardiac work up at this time and instead treat GERD. -     EKG 12 Lead  2. Smoking trying to quit  Nicotine patches have worked in the past for patient. New prescription sent. -     nicotine (NICODERM CQ) 14 MG/24HR; Place 1 patch onto the skin daily for 14 days, Disp-14 patch, R-5Normal  3. Gastroesophageal reflux disease without esophagitis  Patient has hx of GERD. Has been on Protonix 20mg daily but not taking it. Chest pain symptoms are more in the upper stomach with a lot of belching. This could be 2/2 GERD. - patient to restart PPI at increased dose  - patient not notify me if there is no improvement. -     pantoprazole (PROTONIX) 40 MG tablet; TAKE 1 TABLET BY MOUTH EVERY DAY, Disp-90 tablet, R-1Normal  4. Chronic kidney disease, unspecified CKD stage  Has been stable. Check cmp  -     Comprehensive Metabolic Panel; Future  5. Mixed hyperlipidemia  Check lipid panel, cmp   -     Comprehensive Metabolic Panel; Future  -     Hemoglobin A1C; Future  -     Lipid Panel; Future  6. Elevated blood sugar  Check hba1c  -     Hemoglobin A1C; Future  7. Hepatitis C virus infection without hepatic coma, unspecified chronicity  Has not seen GI as recommended. Some mild TTP in the RUQ. Check cmp. Patient to schedule with GI. May need US RUQ as well.      Return in about 3 months (around

## 2023-05-09 ENCOUNTER — TELEPHONE (OUTPATIENT)
Dept: PSYCHOLOGY | Age: 38
End: 2023-05-09

## 2023-05-09 NOTE — TELEPHONE ENCOUNTER
Called pt as he did not show to scheduled appointment at 1pm. He stated that he tried to call earlier because he was having car trouble. He denied any safety concerns. Agreed to reschedule for 5/30.

## 2023-05-11 ASSESSMENT — ENCOUNTER SYMPTOMS
ALLERGIC/IMMUNOLOGIC NEGATIVE: 1
GASTROINTESTINAL NEGATIVE: 1
EYES NEGATIVE: 1
RESPIRATORY NEGATIVE: 1

## 2023-05-12 DIAGNOSIS — F40.10 SOCIAL ANXIETY DISORDER: ICD-10-CM

## 2023-05-12 RX ORDER — LORAZEPAM 1 MG/1
1 TABLET ORAL 2 TIMES DAILY PRN
Qty: 60 TABLET | Refills: 0 | Status: SHIPPED | OUTPATIENT
Start: 2023-05-12 | End: 2023-06-05 | Stop reason: SDUPTHER

## 2023-05-26 DIAGNOSIS — F90.0 ADHD, PREDOMINANTLY INATTENTIVE TYPE: ICD-10-CM

## 2023-05-26 RX ORDER — DEXTROAMPHETAMINE SACCHARATE, AMPHETAMINE ASPARTATE, DEXTROAMPHETAMINE SULFATE AND AMPHETAMINE SULFATE 2.5; 2.5; 2.5; 2.5 MG/1; MG/1; MG/1; MG/1
10 TABLET ORAL 2 TIMES DAILY
Qty: 60 TABLET | Refills: 0 | Status: SHIPPED | OUTPATIENT
Start: 2023-05-26 | End: 2023-06-25 | Stop reason: SDUPTHER

## 2023-05-29 DIAGNOSIS — Z72.0 SMOKING TRYING TO QUIT: ICD-10-CM

## 2023-05-30 RX ORDER — NICOTINE 21 MG/24HR
1 PATCH, TRANSDERMAL 24 HOURS TRANSDERMAL DAILY
Qty: 14 PATCH | Refills: 5 | Status: SHIPPED | OUTPATIENT
Start: 2023-05-30 | End: 2023-06-13

## 2023-06-25 DIAGNOSIS — F90.0 ADHD, PREDOMINANTLY INATTENTIVE TYPE: ICD-10-CM

## 2023-06-26 RX ORDER — DEXTROAMPHETAMINE SACCHARATE, AMPHETAMINE ASPARTATE, DEXTROAMPHETAMINE SULFATE AND AMPHETAMINE SULFATE 2.5; 2.5; 2.5; 2.5 MG/1; MG/1; MG/1; MG/1
10 TABLET ORAL 2 TIMES DAILY
Qty: 60 TABLET | Refills: 0 | Status: SHIPPED | OUTPATIENT
Start: 2023-06-26 | End: 2023-07-26

## 2023-06-27 ENCOUNTER — TELEMEDICINE (OUTPATIENT)
Dept: PSYCHOLOGY | Age: 38
End: 2023-06-27

## 2023-06-27 DIAGNOSIS — F41.9 ANXIETY: ICD-10-CM

## 2023-06-27 DIAGNOSIS — F34.1 PERSISTENT DEPRESSIVE DISORDER: Primary | ICD-10-CM

## 2023-07-07 DIAGNOSIS — F40.10 SOCIAL ANXIETY DISORDER: ICD-10-CM

## 2023-07-07 RX ORDER — LORAZEPAM 1 MG/1
1 TABLET ORAL 2 TIMES DAILY PRN
Qty: 60 TABLET | Refills: 0 | Status: SHIPPED | OUTPATIENT
Start: 2023-07-07 | End: 2023-08-06 | Stop reason: SDUPTHER

## 2023-07-17 ENCOUNTER — TELEMEDICINE (OUTPATIENT)
Dept: PSYCHIATRY | Age: 38
End: 2023-07-17
Payer: COMMERCIAL

## 2023-07-17 DIAGNOSIS — F40.10 SOCIAL ANXIETY DISORDER: Primary | ICD-10-CM

## 2023-07-17 DIAGNOSIS — F90.0 ADHD, PREDOMINANTLY INATTENTIVE TYPE: ICD-10-CM

## 2023-07-17 PROCEDURE — 1036F TOBACCO NON-USER: CPT | Performed by: STUDENT IN AN ORGANIZED HEALTH CARE EDUCATION/TRAINING PROGRAM

## 2023-07-17 PROCEDURE — 99214 OFFICE O/P EST MOD 30 MIN: CPT | Performed by: STUDENT IN AN ORGANIZED HEALTH CARE EDUCATION/TRAINING PROGRAM

## 2023-07-17 PROCEDURE — G8427 DOCREV CUR MEDS BY ELIG CLIN: HCPCS | Performed by: STUDENT IN AN ORGANIZED HEALTH CARE EDUCATION/TRAINING PROGRAM

## 2023-07-17 PROCEDURE — G8417 CALC BMI ABV UP PARAM F/U: HCPCS | Performed by: STUDENT IN AN ORGANIZED HEALTH CARE EDUCATION/TRAINING PROGRAM

## 2023-07-17 RX ORDER — DEXTROAMPHETAMINE SACCHARATE, AMPHETAMINE ASPARTATE, DEXTROAMPHETAMINE SULFATE AND AMPHETAMINE SULFATE 2.5; 2.5; 2.5; 2.5 MG/1; MG/1; MG/1; MG/1
10 TABLET ORAL 2 TIMES DAILY
Qty: 60 TABLET | Refills: 0 | Status: SHIPPED | OUTPATIENT
Start: 2023-07-24 | End: 2023-08-23

## 2023-07-17 ASSESSMENT — PATIENT HEALTH QUESTIONNAIRE - PHQ9
9. THOUGHTS THAT YOU WOULD BE BETTER OFF DEAD, OR OF HURTING YOURSELF: 1
5. POOR APPETITE OR OVEREATING: 2
SUM OF ALL RESPONSES TO PHQ QUESTIONS 1-9: 16
10. IF YOU CHECKED OFF ANY PROBLEMS, HOW DIFFICULT HAVE THESE PROBLEMS MADE IT FOR YOU TO DO YOUR WORK, TAKE CARE OF THINGS AT HOME, OR GET ALONG WITH OTHER PEOPLE: 2
7. TROUBLE CONCENTRATING ON THINGS, SUCH AS READING THE NEWSPAPER OR WATCHING TELEVISION: 2
3. TROUBLE FALLING OR STAYING ASLEEP: 2
2. FEELING DOWN, DEPRESSED OR HOPELESS: 2
8. MOVING OR SPEAKING SO SLOWLY THAT OTHER PEOPLE COULD HAVE NOTICED. OR THE OPPOSITE, BEING SO FIGETY OR RESTLESS THAT YOU HAVE BEEN MOVING AROUND A LOT MORE THAN USUAL: 2
SUM OF ALL RESPONSES TO PHQ QUESTIONS 1-9: 16
SUM OF ALL RESPONSES TO PHQ9 QUESTIONS 1 & 2: 4
4. FEELING TIRED OR HAVING LITTLE ENERGY: 2
1. LITTLE INTEREST OR PLEASURE IN DOING THINGS: 2
SUM OF ALL RESPONSES TO PHQ QUESTIONS 1-9: 16
SUM OF ALL RESPONSES TO PHQ QUESTIONS 1-9: 15
6. FEELING BAD ABOUT YOURSELF - OR THAT YOU ARE A FAILURE OR HAVE LET YOURSELF OR YOUR FAMILY DOWN: 1

## 2023-07-17 ASSESSMENT — ANXIETY QUESTIONNAIRES
3. WORRYING TOO MUCH ABOUT DIFFERENT THINGS: 2
2. NOT BEING ABLE TO STOP OR CONTROL WORRYING: 2
5. BEING SO RESTLESS THAT IT IS HARD TO SIT STILL: 1
6. BECOMING EASILY ANNOYED OR IRRITABLE: 2
4. TROUBLE RELAXING: 2
1. FEELING NERVOUS, ANXIOUS, OR ON EDGE: 2
7. FEELING AFRAID AS IF SOMETHING AWFUL MIGHT HAPPEN: 2
IF YOU CHECKED OFF ANY PROBLEMS ON THIS QUESTIONNAIRE, HOW DIFFICULT HAVE THESE PROBLEMS MADE IT FOR YOU TO DO YOUR WORK, TAKE CARE OF THINGS AT HOME, OR GET ALONG WITH OTHER PEOPLE: VERY DIFFICULT
GAD7 TOTAL SCORE: 13

## 2023-07-17 ASSESSMENT — ENCOUNTER SYMPTOMS
ALLERGIC/IMMUNOLOGIC NEGATIVE: 1
EYES NEGATIVE: 1
RESPIRATORY NEGATIVE: 1
GASTROINTESTINAL NEGATIVE: 1

## 2023-07-23 DIAGNOSIS — F60.3 BORDERLINE PERSONALITY DISORDER (HCC): ICD-10-CM

## 2023-07-23 DIAGNOSIS — F34.1 DYSTHYMIA: ICD-10-CM

## 2023-07-24 RX ORDER — DIVALPROEX SODIUM 500 MG/1
1000 TABLET, DELAYED RELEASE ORAL 2 TIMES DAILY
Qty: 120 TABLET | Refills: 2 | Status: SHIPPED | OUTPATIENT
Start: 2023-07-24 | End: 2023-08-28 | Stop reason: SDUPTHER

## 2023-07-25 DIAGNOSIS — F90.0 ADHD, PREDOMINANTLY INATTENTIVE TYPE: ICD-10-CM

## 2023-07-25 RX ORDER — DEXTROAMPHETAMINE SACCHARATE, AMPHETAMINE ASPARTATE, DEXTROAMPHETAMINE SULFATE AND AMPHETAMINE SULFATE 2.5; 2.5; 2.5; 2.5 MG/1; MG/1; MG/1; MG/1
10 TABLET ORAL 2 TIMES DAILY
Qty: 60 TABLET | Refills: 0 | Status: CANCELLED | OUTPATIENT
Start: 2023-07-25 | End: 2023-08-24

## 2023-08-03 ENCOUNTER — TELEMEDICINE (OUTPATIENT)
Dept: PSYCHOLOGY | Age: 38
End: 2023-08-03
Payer: COMMERCIAL

## 2023-08-03 DIAGNOSIS — F41.9 ANXIETY: ICD-10-CM

## 2023-08-03 DIAGNOSIS — F34.1 PERSISTENT DEPRESSIVE DISORDER: Primary | ICD-10-CM

## 2023-08-03 PROCEDURE — 90832 PSYTX W PT 30 MINUTES: CPT

## 2023-08-03 NOTE — PATIENT INSTRUCTIONS
Return to see Dr. Judi Campos in 2-4 weeks  Continue to make goals for yourself outside of work to aid in sense of accomplishment/mood  Focus on aspects of situations you can control  Call PC office if mood significantly worsens
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
## 2023-08-06 DIAGNOSIS — F40.10 SOCIAL ANXIETY DISORDER: ICD-10-CM

## 2023-08-06 RX ORDER — LORAZEPAM 1 MG/1
1 TABLET ORAL 2 TIMES DAILY PRN
Qty: 60 TABLET | Refills: 0 | Status: SHIPPED | OUTPATIENT
Start: 2023-08-06 | End: 2023-09-05

## 2023-08-16 ENCOUNTER — OFFICE VISIT (OUTPATIENT)
Dept: INTERNAL MEDICINE CLINIC | Age: 38
End: 2023-08-16
Payer: COMMERCIAL

## 2023-08-16 VITALS
OXYGEN SATURATION: 97 % | WEIGHT: 187.6 LBS | DIASTOLIC BLOOD PRESSURE: 80 MMHG | HEIGHT: 68 IN | HEART RATE: 84 BPM | BODY MASS INDEX: 28.43 KG/M2 | SYSTOLIC BLOOD PRESSURE: 130 MMHG

## 2023-08-16 DIAGNOSIS — N18.9 CHRONIC KIDNEY DISEASE, UNSPECIFIED CKD STAGE: ICD-10-CM

## 2023-08-16 DIAGNOSIS — Z72.0 SMOKING TRYING TO QUIT: ICD-10-CM

## 2023-08-16 DIAGNOSIS — E78.2 MIXED HYPERLIPIDEMIA: ICD-10-CM

## 2023-08-16 DIAGNOSIS — B19.20 HEPATITIS C VIRUS INFECTION WITHOUT HEPATIC COMA, UNSPECIFIED CHRONICITY: ICD-10-CM

## 2023-08-16 DIAGNOSIS — Z13.1 SCREENING FOR DIABETES MELLITUS: ICD-10-CM

## 2023-08-16 DIAGNOSIS — E78.2 MIXED HYPERLIPIDEMIA: Primary | ICD-10-CM

## 2023-08-16 DIAGNOSIS — F90.0 ADHD, PREDOMINANTLY INATTENTIVE TYPE: ICD-10-CM

## 2023-08-16 DIAGNOSIS — H81.10 BENIGN PAROXYSMAL POSITIONAL VERTIGO, UNSPECIFIED LATERALITY: ICD-10-CM

## 2023-08-16 PROCEDURE — 99214 OFFICE O/P EST MOD 30 MIN: CPT | Performed by: INTERNAL MEDICINE

## 2023-08-16 PROCEDURE — G8427 DOCREV CUR MEDS BY ELIG CLIN: HCPCS | Performed by: INTERNAL MEDICINE

## 2023-08-16 PROCEDURE — 4004F PT TOBACCO SCREEN RCVD TLK: CPT | Performed by: INTERNAL MEDICINE

## 2023-08-16 PROCEDURE — G8417 CALC BMI ABV UP PARAM F/U: HCPCS | Performed by: INTERNAL MEDICINE

## 2023-08-16 RX ORDER — NICOTINE 21 MG/24HR
1 PATCH, TRANSDERMAL 24 HOURS TRANSDERMAL DAILY
Qty: 14 PATCH | Refills: 5 | Status: SHIPPED | OUTPATIENT
Start: 2023-08-16 | End: 2023-11-08

## 2023-08-16 NOTE — PROGRESS NOTES
Abby Pascual (:  1985) is a 40 y.o. male,Established patient, here for evaluation of the following chief complaint(s):  Follow-up      Vitals:    23 1104   BP: 130/80   Pulse: 84   SpO2: 97%        ASSESSMENT/PLAN:  1. Mixed hyperlipidemia  History of hyperlipidemia not on statin. Check lipid panel  -     Lipid Panel; Future  2. Smoking trying to quit  Working including smoking. Continue nicotine patches  -     nicotine (NICODERM CQ) 14 MG/24HR; Place 1 patch onto the skin daily, Disp-14 patch, R-5Normal  3. Chronic kidney disease, unspecified CKD stage  Has been stable check CMP  -     Comprehensive Metabolic Panel; Future  4. Hepatitis C virus infection without hepatic coma, unspecified chronicity  Check liver function  -     Comprehensive Metabolic Panel; Future  5. Screening for diabetes mellitus  Due for diabetes screening test.  Check malignancy. -     Hemoglobin A1C; Future  6. Benign paroxysmal positional vertigo, unspecified laterality  Symptoms sound like BPPV. Anxiety seems to get worse around the symptoms. Resolved quickly on her own. We will not use meclizine at this time since resolving quickly on its own. Consider vestibular therapy. 7. ADHD, predominantly inattentive type  Controlled on Adderall. Return in about 3 months (around 2023). SUBJECTIVE/OBJECTIVE:  HPI    Patient is a 26-year-old male with past medical history of CKD, history of LINA, hyperlipidemia, ADHD who presents for follow-up for the above listed chronic diseases. He notes completing his work much better since meeting with Dr. Ayana Calderon and started on Adderall. Has a new job. Seeing Dr. Ayana Calderon regularly. The chest discomfort has gone away. Has been taking the Protonix at increased dose. Starting a few years ago, He notes intermittently feeling dizziness. Notes room dose feel spinning. Patient notes that it worsens with changing position. Notes happens once every few weeks.  Only lasts for

## 2023-08-22 ENCOUNTER — TELEMEDICINE (OUTPATIENT)
Dept: PSYCHOLOGY | Age: 38
End: 2023-08-22

## 2023-08-22 DIAGNOSIS — F34.1 PERSISTENT DEPRESSIVE DISORDER: Primary | ICD-10-CM

## 2023-08-22 DIAGNOSIS — F41.9 ANXIETY: ICD-10-CM

## 2023-08-22 ASSESSMENT — PATIENT HEALTH QUESTIONNAIRE - PHQ9
SUM OF ALL RESPONSES TO PHQ QUESTIONS 1-9: 16
2. FEELING DOWN, DEPRESSED OR HOPELESS: 1
5. POOR APPETITE OR OVEREATING: 3
8. MOVING OR SPEAKING SO SLOWLY THAT OTHER PEOPLE COULD HAVE NOTICED. OR THE OPPOSITE, BEING SO FIGETY OR RESTLESS THAT YOU HAVE BEEN MOVING AROUND A LOT MORE THAN USUAL: 1
SUM OF ALL RESPONSES TO PHQ QUESTIONS 1-9: 16
7. TROUBLE CONCENTRATING ON THINGS, SUCH AS READING THE NEWSPAPER OR WATCHING TELEVISION: 3
9. THOUGHTS THAT YOU WOULD BE BETTER OFF DEAD, OR OF HURTING YOURSELF: 0
3. TROUBLE FALLING OR STAYING ASLEEP: 3
6. FEELING BAD ABOUT YOURSELF - OR THAT YOU ARE A FAILURE OR HAVE LET YOURSELF OR YOUR FAMILY DOWN: 1
1. LITTLE INTEREST OR PLEASURE IN DOING THINGS: 1
4. FEELING TIRED OR HAVING LITTLE ENERGY: 3
SUM OF ALL RESPONSES TO PHQ9 QUESTIONS 1 & 2: 2
10. IF YOU CHECKED OFF ANY PROBLEMS, HOW DIFFICULT HAVE THESE PROBLEMS MADE IT FOR YOU TO DO YOUR WORK, TAKE CARE OF THINGS AT HOME, OR GET ALONG WITH OTHER PEOPLE: 3

## 2023-08-22 ASSESSMENT — ANXIETY QUESTIONNAIRES
4. TROUBLE RELAXING: 3
6. BECOMING EASILY ANNOYED OR IRRITABLE: 3
3. WORRYING TOO MUCH ABOUT DIFFERENT THINGS: 3
1. FEELING NERVOUS, ANXIOUS, OR ON EDGE: 2
5. BEING SO RESTLESS THAT IT IS HARD TO SIT STILL: 3
IF YOU CHECKED OFF ANY PROBLEMS ON THIS QUESTIONNAIRE, HOW DIFFICULT HAVE THESE PROBLEMS MADE IT FOR YOU TO DO YOUR WORK, TAKE CARE OF THINGS AT HOME, OR GET ALONG WITH OTHER PEOPLE: VERY DIFFICULT
2. NOT BEING ABLE TO STOP OR CONTROL WORRYING: 3
GAD7 TOTAL SCORE: 20
7. FEELING AFRAID AS IF SOMETHING AWFUL MIGHT HAPPEN: 3

## 2023-08-22 NOTE — PROGRESS NOTES
Behavioral Health Consultation  WILFRED AVILA Psy.D. Psychologist  8/22/2023  8:01 AM EDT      Time spent with Patient: 22 minutes  This is patient's seventh  [facility] appointment. Reason for Consult:    Chief Complaint   Patient presents with    Depression    Anxiety     Referring Provider: Sunil Silverio MD, psychiatrist   [address]  [phone]    Feedback given to PCP/psychiatrist: not indicated at this time. TELEHEALTH VISIT -- Audio/Visual    Services were provided through a video synchronous discussion virtually to substitute for in-person clinic visit. Pt gave verbal informed consent to participate in telehealth services. Conducted a risk-benefit analysis and determined that the patient's presenting problems are consistent with the use of telepsychology. Determined that the patient has sufficient knowledge and skills in the use of technology enabling them to adequately benefit from telepsychology. It was determined that this patient was able to be properly treated without an in-person session. Patient verified that they were currently located at the [place] address that was provided during registration. Verified the following information:  Patient's identification: Yes  Patient location: [address] [place] [address]. [facility].   Patient's call back number: [phone]   Patient's emergency contact's name and number, as well as permission to contact them if needed: Extended Emergency Contact Information  Primary Emergency Contact: Roderick Yoo  Address: [address], [address] [place] [address] [place] [address] Phone: [phone]  Mobile Phone: [phone]  Relation: Other   needed? No     Provider location: [place] [address] [address]:  Pt reported he has overall been doing good. Had some contact with his children. Continues to stay busy with work. Working on getting his truck fixed.  Feels this will be a relief

## 2023-08-28 DIAGNOSIS — F90.0 ADHD, PREDOMINANTLY INATTENTIVE TYPE: ICD-10-CM

## 2023-08-28 DIAGNOSIS — F60.3 BORDERLINE PERSONALITY DISORDER (HCC): ICD-10-CM

## 2023-08-28 DIAGNOSIS — F34.1 DYSTHYMIA: ICD-10-CM

## 2023-08-28 RX ORDER — DEXTROAMPHETAMINE SACCHARATE, AMPHETAMINE ASPARTATE, DEXTROAMPHETAMINE SULFATE AND AMPHETAMINE SULFATE 2.5; 2.5; 2.5; 2.5 MG/1; MG/1; MG/1; MG/1
10 TABLET ORAL 2 TIMES DAILY
Qty: 60 TABLET | Refills: 0 | Status: SHIPPED | OUTPATIENT
Start: 2023-09-25 | End: 2023-10-25

## 2023-08-28 RX ORDER — DEXTROAMPHETAMINE SACCHARATE, AMPHETAMINE ASPARTATE, DEXTROAMPHETAMINE SULFATE AND AMPHETAMINE SULFATE 2.5; 2.5; 2.5; 2.5 MG/1; MG/1; MG/1; MG/1
10 TABLET ORAL 2 TIMES DAILY
Qty: 60 TABLET | Refills: 0 | Status: SHIPPED | OUTPATIENT
Start: 2023-08-28 | End: 2023-09-27

## 2023-08-28 RX ORDER — DIVALPROEX SODIUM 500 MG/1
1000 TABLET, DELAYED RELEASE ORAL 2 TIMES DAILY
Qty: 120 TABLET | Refills: 2 | Status: SHIPPED | OUTPATIENT
Start: 2023-08-28

## 2023-08-28 RX ORDER — LORAZEPAM 1 MG/1
1 TABLET ORAL 2 TIMES DAILY PRN
Qty: 60 TABLET | Refills: 1 | Status: SHIPPED | OUTPATIENT
Start: 2023-09-03 | End: 2023-11-02

## 2023-08-28 RX ORDER — TRAZODONE HYDROCHLORIDE 100 MG/1
100 TABLET ORAL NIGHTLY
Qty: 30 TABLET | Refills: 2 | Status: SHIPPED | OUTPATIENT
Start: 2023-08-28

## 2023-08-28 RX ORDER — RISPERIDONE 0.5 MG/1
0.5 TABLET ORAL NIGHTLY
Qty: 30 TABLET | Refills: 2 | Status: SHIPPED | OUTPATIENT
Start: 2023-08-28

## 2023-08-28 NOTE — TELEPHONE ENCOUNTER
Termination refills provided today given 4th calendar year no show. 2 months of controlled substances and 3 months of non-controlled were provided.

## 2023-09-12 ENCOUNTER — TELEMEDICINE (OUTPATIENT)
Dept: PSYCHOLOGY | Age: 38
End: 2023-09-12

## 2023-09-12 DIAGNOSIS — F34.1 PERSISTENT DEPRESSIVE DISORDER: Primary | ICD-10-CM

## 2023-09-12 DIAGNOSIS — F41.9 ANXIETY: ICD-10-CM

## 2023-09-12 NOTE — PROGRESS NOTES
Behavioral Health Consultation  WILFRED AVILA Psy.D. Psychologist  9/12/2023  8:02 AM EDT      Time spent with Patient: 23 minutes  This is patient's eighth  [facility] appointment. Reason for Consult:    Chief Complaint   Patient presents with    Depression    Anxiety     Referring Provider: Sunil Silverio MD, psychiatrist   [address]  [phone]    Feedback given to PCP: not indicated at this time. TELEHEALTH VISIT -- Audio/Visual    Services were provided through a video synchronous discussion virtually to substitute for in-person clinic visit. Pt gave verbal informed consent to participate in telehealth services. Conducted a risk-benefit analysis and determined that the patient's presenting problems are consistent with the use of telepsychology. Determined that the patient has sufficient knowledge and skills in the use of technology enabling them to adequately benefit from telepsychology. It was determined that this patient was able to be properly treated without an in-person session. Patient verified that they were currently located at the [place] address that was provided during registration. Verified the following information:  Patient's identification: Yes  Patient location: [address] [place] [address]. [facility].   Patient's call back number: [phone]   Patient's emergency contact's name and number, as well as permission to contact them if needed: Extended Emergency Contact Information  Primary Emergency Contact: Roderick Yoo  Address: [address], [address] [place] [address] [place] [address] Phone: [phone]  Mobile Phone: [phone]  Relation: Other   needed? No     Provider location: [place] [address]:  Reported lately he cannot think straight and has been \"foggy minded. \" Feels he has been more forgetful. Missed last appointment with Dr. Gwen Leone and was dismissed from his care.  Needs to find a

## 2023-09-12 NOTE — PATIENT INSTRUCTIONS
of lack of sleep. Be sure your mattress is not too soft or too firm and that your pillow is the right height and firmness. Eating  A light bedtime snack, such a glass of warm milk, cheese, or a bowl of cereal, can promote sleep. You should avoid the following foods at bedtime:  any caffeinated foods (e.g., chocolate), peanuts, beans, most raw fruits and vegetables (since they may cause gas), and high-fat foods such as potato chips or corn chips. Avoid snacks in the middle of the nights since awakening may become associated with hunger. If you have trouble with regurgitation, be especially careful to avoid heavy meals and spices in the evening. Do not go to bed too hungry or too full. It may help to elevate your head with some pillows. Allow Yourself at [facility] an Hour Before Bedtime to Unwind  Find what works for you to wind down, and perhaps give yourself an hour to do so. Regular Sleep Schedule   Keep a regular time each day, 7 days a week, to get out of bed. Keeping a regular waking time helps set your circadian rhythm so that your body learns to sleep at the desired time. Set a Reasonable Bedtime and Arising Time and Stick to Them   Set the alarm clock and get out of bed at the same time each morning, weekdays and weekends, regardless of your bedtime or the amount of sleep you obtained on the previous night. This guideline is designed to regulate your internal biological clock and reset your clock. Guidelines for Sleep Stimulus Control    Set a Reasonable Bedtime and Arising Time and Stick to Them  Spending excessive time in bed has two unfortunate consequences: (1) you begin to associate your bedroom with arousal and frustration and (2) your sleep actually becomes more shallow. Restrict your sleep period to the average number of hours you have actually slept per night during the preceding week, plus one additional hour.  Set the alarm clock and get out of bed at the same time each

## 2023-10-03 ENCOUNTER — TELEMEDICINE (OUTPATIENT)
Dept: PSYCHOLOGY | Age: 38
End: 2023-10-03
Payer: COMMERCIAL

## 2023-10-03 DIAGNOSIS — F34.1 PERSISTENT DEPRESSIVE DISORDER: Primary | ICD-10-CM

## 2023-10-03 DIAGNOSIS — F41.9 ANXIETY: ICD-10-CM

## 2023-10-03 PROCEDURE — 90832 PSYTX W PT 30 MINUTES: CPT

## 2023-10-03 NOTE — PATIENT INSTRUCTIONS
Return to see Dr. Brenda Bolivar in 2 weeks  Continue engagement in healthy stress management strategies, such as skateboarding or utilizing social support  Focus on managing emotional reactions when communicating   Remember STOP (Stop. Take a breath. Observe. Proceed.)   Call PC office if mood significantly worsens      Psychiatry Referrals  [facility] [address], [place], [address]  [phone]  [email or web address]    [facility]  Accept most insurances, but not medicaid or [address] [facility], [facility] [address]  [phone]  [email or web address]    [facility])  Accepts most insurances  Many locations  [phone]  [email or web address]  Recommend scheduling online because telephone can have long wait times  [email or web address]  Can utilize this website and filter by location and insurance

## 2023-10-03 NOTE — PROGRESS NOTES
0-4: Minimal Anxiety. Score 5-9: Mild Anxiety. Score 10-14: Moderate Anxiety. Score greater than 15: Severe Anxiety. Diagnosis:    1. Persistent depressive disorder    2. Anxiety        Past Medical History      Diagnosis Date    ADHD, predominantly inattentive type 2/2/2023    Anxiety     Attention deficit hyperactivity disorder (ADHD), combined type     childhood per pt'. Chronic kidney disease     Depression     Hepatitis C     No prior treatment    Mixed hyperlipidemia 07/12/2022       Plan:  Pt interventions:  Trained in strategies for increasing balanced thinking, Discussed and set plan for behavioral activation, Trained in relaxation strategies, Trained in improving communication skills, Discussed self-care (sleep, nutrition, rewarding activities, social support, exercise), Supportive techniques, and Emphasized self-care as important for managing overall health    Pt Behavioral Change Plan:   See Pt Instructions.

## 2023-10-06 DIAGNOSIS — K21.9 GASTROESOPHAGEAL REFLUX DISEASE WITHOUT ESOPHAGITIS: ICD-10-CM

## 2023-10-06 DIAGNOSIS — F60.3 BORDERLINE PERSONALITY DISORDER (HCC): ICD-10-CM

## 2023-10-06 DIAGNOSIS — F34.1 DYSTHYMIA: ICD-10-CM

## 2023-10-06 RX ORDER — RISPERIDONE 0.5 MG/1
0.5 TABLET ORAL
Qty: 90 TABLET | OUTPATIENT
Start: 2023-10-06

## 2023-10-06 RX ORDER — PANTOPRAZOLE SODIUM 40 MG/1
TABLET, DELAYED RELEASE ORAL
Qty: 30 TABLET | Refills: 0 | Status: SHIPPED | OUTPATIENT
Start: 2023-10-06

## 2023-10-06 NOTE — TELEPHONE ENCOUNTER
Patient not made a new patient appointment. He is still looking around.  He has been advised as of 11/1 the medications that Dr. Fidelia Talbert prescribes will need to be done by another PCP

## 2023-10-24 ENCOUNTER — TELEMEDICINE (OUTPATIENT)
Dept: PSYCHOLOGY | Age: 38
End: 2023-10-24
Payer: COMMERCIAL

## 2023-10-24 DIAGNOSIS — F41.9 ANXIETY: ICD-10-CM

## 2023-10-24 DIAGNOSIS — F34.1 PERSISTENT DEPRESSIVE DISORDER: Primary | ICD-10-CM

## 2023-10-24 PROCEDURE — 90832 PSYTX W PT 30 MINUTES: CPT

## 2023-10-24 ASSESSMENT — PATIENT HEALTH QUESTIONNAIRE - PHQ9
SUM OF ALL RESPONSES TO PHQ QUESTIONS 1-9: 20
SUM OF ALL RESPONSES TO PHQ QUESTIONS 1-9: 20
2. FEELING DOWN, DEPRESSED OR HOPELESS: 3
3. TROUBLE FALLING OR STAYING ASLEEP: 3
1. LITTLE INTEREST OR PLEASURE IN DOING THINGS: 2
SUM OF ALL RESPONSES TO PHQ QUESTIONS 1-9: 20
SUM OF ALL RESPONSES TO PHQ9 QUESTIONS 1 & 2: 5
SUM OF ALL RESPONSES TO PHQ QUESTIONS 1-9: 20
9. THOUGHTS THAT YOU WOULD BE BETTER OFF DEAD, OR OF HURTING YOURSELF: 0
6. FEELING BAD ABOUT YOURSELF - OR THAT YOU ARE A FAILURE OR HAVE LET YOURSELF OR YOUR FAMILY DOWN: 0
4. FEELING TIRED OR HAVING LITTLE ENERGY: 3
5. POOR APPETITE OR OVEREATING: 3
7. TROUBLE CONCENTRATING ON THINGS, SUCH AS READING THE NEWSPAPER OR WATCHING TELEVISION: 3
8. MOVING OR SPEAKING SO SLOWLY THAT OTHER PEOPLE COULD HAVE NOTICED. OR THE OPPOSITE, BEING SO FIGETY OR RESTLESS THAT YOU HAVE BEEN MOVING AROUND A LOT MORE THAN USUAL: 3

## 2023-10-24 ASSESSMENT — ANXIETY QUESTIONNAIRES
6. BECOMING EASILY ANNOYED OR IRRITABLE: 3
5. BEING SO RESTLESS THAT IT IS HARD TO SIT STILL: 3
2. NOT BEING ABLE TO STOP OR CONTROL WORRYING: 3
1. FEELING NERVOUS, ANXIOUS, OR ON EDGE: 3
GAD7 TOTAL SCORE: 21
7. FEELING AFRAID AS IF SOMETHING AWFUL MIGHT HAPPEN: 3
4. TROUBLE RELAXING: 3
3. WORRYING TOO MUCH ABOUT DIFFERENT THINGS: 3

## 2023-10-24 NOTE — PROGRESS NOTES
girlfriend stated he does not show any emotion. Pt discussed that showing positive emotions were not modeled for him growing up. Despite increased anxiety and stress, he reported he has been able to keep his job which is something he has struggled with in the past.     Pt reported he found a psychiatry provider and therapist at [facility]. Has upcoming appointments. O:  MSE:    Appearance: good hygiene   Attitude: cooperative and friendly  Consciousness: alert  Orientation: oriented to person, place, time, general circumstance  Memory: recent and remote memory intact  Attention/Concentration: intact during session  Psychomotor Activity:normal  Eye Contact: normal  Speech: normal rate and volume, well-articulated  Mood: \"all over the place\"  Affect:  overall euthymic  Perception: within normal limits  Thought Content: within normal limits  Thought Process: logical, coherent and goal-directed  Insight: good  Judgment: intact  Ability to understand instructions: Yes  Ability to respond meaningfully: Yes  Morbid Ideation: no   Suicide Assessment: no suicidal ideation, plan, or intent  Homicidal Ideation: no    History:    Medications:   Current Outpatient Medications   Medication Sig Dispense Refill    pantoprazole (PROTONIX) 40 MG tablet TAKE 1 TABLET BY MOUTH EVERY DAY 30 tablet 0    divalproex (DEPAKOTE) 500 MG DR tablet Take 2 tablets by mouth in the morning and at bedtime 120 tablet 2    risperiDONE (RISPERDAL) 0.5 MG tablet Take 1 tablet by mouth nightly 30 tablet 2    traZODone (DESYREL) 100 MG tablet Take 1 tablet by mouth nightly 30 tablet 2    LORazepam (ATIVAN) 1 MG tablet Take 1 tablet by mouth 2 times daily as needed for Anxiety for up to 60 days. Max Daily Amount: 2 mg 60 tablet 1    amphetamine-dextroamphetamine (ADDERALL, 10MG,) 10 MG tablet Take 1 tablet by mouth 2 times daily at 0800 and 1400 for 30 days.  Max Daily Amount: 20 mg 60 tablet 0    amphetamine-dextroamphetamine (ADDERALL, 10MG,)

## 2023-11-02 DIAGNOSIS — F34.1 DYSTHYMIA: ICD-10-CM

## 2023-11-02 DIAGNOSIS — F60.3 BORDERLINE PERSONALITY DISORDER (HCC): ICD-10-CM

## 2023-11-02 RX ORDER — RISPERIDONE 0.5 MG/1
0.5 TABLET ORAL
Qty: 90 TABLET | OUTPATIENT
Start: 2023-11-02

## 2023-11-16 ENCOUNTER — OFFICE VISIT (OUTPATIENT)
Dept: PRIMARY CARE CLINIC | Age: 38
End: 2023-11-16

## 2023-11-16 VITALS
WEIGHT: 181 LBS | HEIGHT: 68 IN | OXYGEN SATURATION: 97 % | BODY MASS INDEX: 27.43 KG/M2 | SYSTOLIC BLOOD PRESSURE: 128 MMHG | HEART RATE: 96 BPM | DIASTOLIC BLOOD PRESSURE: 89 MMHG | TEMPERATURE: 99.3 F

## 2023-11-16 DIAGNOSIS — Z51.81 THERAPEUTIC DRUG MONITORING: ICD-10-CM

## 2023-11-16 DIAGNOSIS — Z00.00 ANNUAL PHYSICAL EXAM: ICD-10-CM

## 2023-11-16 DIAGNOSIS — E78.2 MIXED HYPERLIPIDEMIA: ICD-10-CM

## 2023-11-16 DIAGNOSIS — M54.9 UPPER BACK PAIN ON RIGHT SIDE: Primary | ICD-10-CM

## 2023-11-16 DIAGNOSIS — B19.20 HEPATITIS C VIRUS INFECTION WITHOUT HEPATIC COMA, UNSPECIFIED CHRONICITY: ICD-10-CM

## 2023-11-16 DIAGNOSIS — K21.9 GASTROESOPHAGEAL REFLUX DISEASE WITHOUT ESOPHAGITIS: ICD-10-CM

## 2023-11-16 DIAGNOSIS — N18.9 CHRONIC KIDNEY DISEASE, UNSPECIFIED CKD STAGE: ICD-10-CM

## 2023-11-16 PROBLEM — E66.3 PATIENT OVERWEIGHT: Status: RESOLVED | Noted: 2017-03-07 | Resolved: 2023-11-16

## 2023-11-16 RX ORDER — LORAZEPAM 1 MG/1
1 TABLET ORAL 2 TIMES DAILY PRN
COMMUNITY
Start: 2023-11-05

## 2023-11-16 RX ORDER — PANTOPRAZOLE SODIUM 40 MG/1
40 TABLET, DELAYED RELEASE ORAL DAILY
Qty: 30 TABLET | Refills: 5 | Status: SHIPPED | OUTPATIENT
Start: 2023-11-16

## 2023-11-16 SDOH — HEALTH STABILITY: PHYSICAL HEALTH: ON AVERAGE, HOW MANY DAYS PER WEEK DO YOU ENGAGE IN MODERATE TO STRENUOUS EXERCISE (LIKE A BRISK WALK)?: 5 DAYS

## 2023-11-16 ASSESSMENT — SOCIAL DETERMINANTS OF HEALTH (SDOH)
WITHIN THE LAST YEAR, HAVE YOU BEEN HUMILIATED OR EMOTIONALLY ABUSED IN OTHER WAYS BY YOUR PARTNER OR EX-PARTNER?: YES
WITHIN THE LAST YEAR, HAVE YOU BEEN AFRAID OF YOUR PARTNER OR EX-PARTNER?: NO
WITHIN THE LAST YEAR, HAVE YOU BEEN KICKED, HIT, SLAPPED, OR OTHERWISE PHYSICALLY HURT BY YOUR PARTNER OR EX-PARTNER?: NO
WITHIN THE LAST YEAR, HAVE TO BEEN RAPED OR FORCED TO HAVE ANY KIND OF SEXUAL ACTIVITY BY YOUR PARTNER OR EX-PARTNER?: NO

## 2023-11-16 ASSESSMENT — ENCOUNTER SYMPTOMS
CONSTIPATION: 0
DIARRHEA: 0
BACK PAIN: 1

## 2024-03-26 DIAGNOSIS — F34.1 DYSTHYMIA: ICD-10-CM

## 2024-03-26 RX ORDER — TRAZODONE HYDROCHLORIDE 100 MG/1
100 TABLET ORAL NIGHTLY
Qty: 90 TABLET | OUTPATIENT
Start: 2024-03-26